# Patient Record
Sex: MALE | Race: WHITE | NOT HISPANIC OR LATINO | Employment: OTHER | URBAN - METROPOLITAN AREA
[De-identification: names, ages, dates, MRNs, and addresses within clinical notes are randomized per-mention and may not be internally consistent; named-entity substitution may affect disease eponyms.]

---

## 2017-06-26 ENCOUNTER — HOSPITAL ENCOUNTER (OUTPATIENT)
Dept: CARDIOLOGY | Facility: HOSPITAL | Age: 52
Discharge: HOME | End: 2017-06-26
Attending: INTERNAL MEDICINE | Admitting: INTERNAL MEDICINE

## 2017-09-12 ENCOUNTER — HOSPITAL ENCOUNTER (OUTPATIENT)
Dept: OTHER | Facility: HOSPITAL | Age: 52
Discharge: HOME | End: 2017-09-12
Attending: PHYSICIAN ASSISTANT | Admitting: PHYSICIAN ASSISTANT

## 2017-10-13 ENCOUNTER — OFFICE (AMBULATORY)
Dept: URBAN - METROPOLITAN AREA CLINIC 64 | Facility: CLINIC | Age: 52
End: 2017-10-13

## 2017-10-13 ENCOUNTER — ON CAMPUS - OUTPATIENT (AMBULATORY)
Dept: URBAN - METROPOLITAN AREA HOSPITAL 2 | Facility: HOSPITAL | Age: 52
End: 2017-10-13
Payer: COMMERCIAL

## 2017-10-13 VITALS
SYSTOLIC BLOOD PRESSURE: 133 MMHG | DIASTOLIC BLOOD PRESSURE: 62 MMHG | SYSTOLIC BLOOD PRESSURE: 119 MMHG | SYSTOLIC BLOOD PRESSURE: 125 MMHG | HEART RATE: 67 BPM | OXYGEN SATURATION: 96 % | RESPIRATION RATE: 16 BRPM | DIASTOLIC BLOOD PRESSURE: 73 MMHG | OXYGEN SATURATION: 98 % | OXYGEN SATURATION: 92 % | HEART RATE: 66 BPM | RESPIRATION RATE: 18 BRPM | DIASTOLIC BLOOD PRESSURE: 65 MMHG | OXYGEN SATURATION: 95 % | HEART RATE: 65 BPM | DIASTOLIC BLOOD PRESSURE: 77 MMHG | HEART RATE: 68 BPM | SYSTOLIC BLOOD PRESSURE: 143 MMHG | HEIGHT: 71 IN | HEART RATE: 73 BPM | OXYGEN SATURATION: 97 % | SYSTOLIC BLOOD PRESSURE: 94 MMHG | HEART RATE: 60 BPM | TEMPERATURE: 97.7 F | WEIGHT: 315 LBS | DIASTOLIC BLOOD PRESSURE: 68 MMHG | DIASTOLIC BLOOD PRESSURE: 48 MMHG | SYSTOLIC BLOOD PRESSURE: 135 MMHG | DIASTOLIC BLOOD PRESSURE: 67 MMHG | SYSTOLIC BLOOD PRESSURE: 149 MMHG

## 2017-10-13 DIAGNOSIS — K63.3 ULCER OF INTESTINE: ICD-10-CM

## 2017-10-13 DIAGNOSIS — Z86.010 PERSONAL HISTORY OF COLONIC POLYPS: ICD-10-CM

## 2017-10-13 DIAGNOSIS — K62.6 ULCER OF ANUS AND RECTUM: ICD-10-CM

## 2017-10-13 LAB
GI HISTOLOGY: A. UNSPECIFIED: (no result)
GI HISTOLOGY: PDF REPORT: (no result)

## 2017-10-13 PROCEDURE — 88305 TISSUE EXAM BY PATHOLOGIST: CPT | Performed by: INTERNAL MEDICINE

## 2017-10-13 PROCEDURE — 45380 COLONOSCOPY AND BIOPSY: CPT | Mod: 33 | Performed by: INTERNAL MEDICINE

## 2017-10-13 RX ADMIN — PROPOFOL: 10 INJECTION, EMULSION INTRAVENOUS at 13:13

## 2017-11-21 ENCOUNTER — HOSPITAL ENCOUNTER (OUTPATIENT)
Dept: PREOP | Facility: HOSPITAL | Age: 52
Setting detail: HOSPITAL OUTPATIENT SURGERY
Discharge: HOME | End: 2017-11-21
Attending: SURGERY | Admitting: SURGERY

## 2018-02-12 ENCOUNTER — CONVERSION ENCOUNTER (OUTPATIENT)
Dept: CARDIOLOGY | Facility: CLINIC | Age: 53
End: 2018-02-12

## 2018-02-15 ENCOUNTER — CONVERSION ENCOUNTER (OUTPATIENT)
Dept: CARDIOLOGY | Facility: CLINIC | Age: 53
End: 2018-02-15

## 2018-03-26 ENCOUNTER — HOSPITAL ENCOUNTER (OUTPATIENT)
Dept: OTHER | Facility: HOSPITAL | Age: 53
Discharge: HOME | End: 2018-03-26
Attending: SURGERY | Admitting: SURGERY

## 2018-08-20 ENCOUNTER — HOSPITAL ENCOUNTER (OUTPATIENT)
Dept: OTHER | Facility: HOSPITAL | Age: 53
Discharge: HOME | End: 2018-08-20
Attending: SURGERY | Admitting: SURGERY

## 2018-08-20 LAB
ALBUMIN SERPL-MCNC: 4.4 G/DL (ref 3.5–4.8)
ALBUMIN/GLOB SERPL: 1.6 {RATIO} (ref 1–1.7)
ALP SERPL-CCNC: 49 IU/L (ref 32–91)
ALT SERPL-CCNC: 19 IU/L (ref 17–63)
ANION GAP SERPL CALC-SCNC: 13.8 MMOL/L (ref 10–20)
AST SERPL-CCNC: 27 IU/L (ref 15–41)
BASOPHILS # BLD AUTO: 0.1 10*3/UL (ref 0–0.2)
BASOPHILS NFR BLD AUTO: 1 % (ref 0–2)
BILIRUB SERPL-MCNC: 2.3 MG/DL (ref 0.3–1.2)
BUN SERPL-MCNC: 31 MG/DL (ref 8–20)
BUN/CREAT SERPL: 25.8 (ref 6.2–20.3)
CALCIUM SERPL-MCNC: 9.8 MG/DL (ref 8.9–10.3)
CHLORIDE SERPL-SCNC: 101 MMOL/L (ref 101–111)
CONV CO2: 27 MMOL/L (ref 22–32)
CONV TOTAL PROTEIN: 7.1 G/DL (ref 6.1–7.9)
CREAT UR-MCNC: 1.2 MG/DL (ref 0.7–1.2)
DIFFERENTIAL METHOD BLD: (no result)
EOSINOPHIL # BLD AUTO: 0.2 10*3/UL (ref 0–0.3)
EOSINOPHIL # BLD AUTO: 3 % (ref 0–3)
ERYTHROCYTE [DISTWIDTH] IN BLOOD BY AUTOMATED COUNT: 13.4 % (ref 11.5–14.5)
GLOBULIN UR ELPH-MCNC: 2.7 G/DL (ref 2.5–3.8)
GLUCOSE SERPL-MCNC: 85 MG/DL (ref 65–99)
HCT VFR BLD AUTO: 42.4 % (ref 40–54)
HGB BLD-MCNC: 14.8 G/DL (ref 14–18)
LYMPHOCYTES # BLD AUTO: 2.9 10*3/UL (ref 0.8–4.8)
LYMPHOCYTES NFR BLD AUTO: 35 % (ref 18–42)
MAGNESIUM SERPL-MCNC: 2.1 MG/DL (ref 1.8–2.5)
MCH RBC QN AUTO: 32.1 PG (ref 26–32)
MCHC RBC AUTO-ENTMCNC: 34.9 G/DL (ref 32–36)
MCV RBC AUTO: 91.8 FL (ref 80–94)
MONOCYTES # BLD AUTO: 0.6 10*3/UL (ref 0.1–1.3)
MONOCYTES NFR BLD AUTO: 8 % (ref 2–11)
NEUTROPHILS # BLD AUTO: 4.5 10*3/UL (ref 2.3–8.6)
NEUTROPHILS NFR BLD AUTO: 53 % (ref 50–75)
NRBC BLD AUTO-RTO: 0 /100{WBCS}
NRBC/RBC NFR BLD MANUAL: 0 10*3/UL
PHOSPHATE SERPL-MCNC: 3.9 MG/DL (ref 2.4–4.7)
PLATELET # BLD AUTO: 219 10*3/UL (ref 150–450)
PMV BLD AUTO: 7.9 FL (ref 7.4–10.4)
POTASSIUM SERPL-SCNC: 3.8 MMOL/L (ref 3.6–5.1)
RBC # BLD AUTO: 4.62 10*6/UL (ref 4.6–6)
SODIUM SERPL-SCNC: 138 MMOL/L (ref 136–144)
WBC # BLD AUTO: 8.3 10*3/UL (ref 4.5–11.5)

## 2019-02-23 ENCOUNTER — HOSPITAL ENCOUNTER (OUTPATIENT)
Dept: LAB | Facility: HOSPITAL | Age: 54
Discharge: HOME | End: 2019-02-23
Attending: SURGERY | Admitting: SURGERY

## 2019-02-23 LAB
ALBUMIN SERPL-MCNC: 4.2 G/DL (ref 3.5–4.8)
ALBUMIN/GLOB SERPL: 2 {RATIO} (ref 1–1.7)
ALP SERPL-CCNC: 52 IU/L (ref 32–91)
ALT SERPL-CCNC: 20 IU/L (ref 17–63)
ANION GAP SERPL CALC-SCNC: 12.9 MMOL/L (ref 10–20)
AST SERPL-CCNC: 27 IU/L (ref 15–41)
BASOPHILS # BLD AUTO: 0 10*3/UL (ref 0–0.2)
BASOPHILS NFR BLD AUTO: 1 % (ref 0–2)
BILIRUB SERPL-MCNC: 1.7 MG/DL (ref 0.3–1.2)
BUN SERPL-MCNC: 26 MG/DL (ref 8–20)
BUN/CREAT SERPL: 23.6 (ref 6.2–20.3)
CALCIUM SERPL-MCNC: 9.2 MG/DL (ref 8.9–10.3)
CHLORIDE SERPL-SCNC: 103 MMOL/L (ref 101–111)
CONV CO2: 27 MMOL/L (ref 22–32)
CONV TOTAL PROTEIN: 6.3 G/DL (ref 6.1–7.9)
CREAT UR-MCNC: 1.1 MG/DL (ref 0.7–1.2)
DIFFERENTIAL METHOD BLD: (no result)
EOSINOPHIL # BLD AUTO: 0.2 10*3/UL (ref 0–0.3)
EOSINOPHIL # BLD AUTO: 4 % (ref 0–3)
ERYTHROCYTE [DISTWIDTH] IN BLOOD BY AUTOMATED COUNT: 13.7 % (ref 11.5–14.5)
GLOBULIN UR ELPH-MCNC: 2.1 G/DL (ref 2.5–3.8)
GLUCOSE SERPL-MCNC: 94 MG/DL (ref 65–99)
HCT VFR BLD AUTO: 44.8 % (ref 40–54)
HGB BLD-MCNC: 15.3 G/DL (ref 14–18)
IRON SERPL-MCNC: 104 UG/DL (ref 45–182)
LYMPHOCYTES # BLD AUTO: 1.9 10*3/UL (ref 0.8–4.8)
LYMPHOCYTES NFR BLD AUTO: 36 % (ref 18–42)
MAGNESIUM SERPL-MCNC: 2 MG/DL (ref 1.8–2.5)
MCH RBC QN AUTO: 31.8 PG (ref 26–32)
MCHC RBC AUTO-ENTMCNC: 34.1 G/DL (ref 32–36)
MCV RBC AUTO: 93.1 FL (ref 80–94)
MONOCYTES # BLD AUTO: 0.4 10*3/UL (ref 0.1–1.3)
MONOCYTES NFR BLD AUTO: 7 % (ref 2–11)
NEUTROPHILS # BLD AUTO: 2.7 10*3/UL (ref 2.3–8.6)
NEUTROPHILS NFR BLD AUTO: 52 % (ref 50–75)
NRBC BLD AUTO-RTO: 0 /100{WBCS}
NRBC/RBC NFR BLD MANUAL: 0 10*3/UL
PHOSPHATE SERPL-MCNC: 3.4 MG/DL (ref 2.4–4.7)
PLATELET # BLD AUTO: 219 10*3/UL (ref 150–450)
PMV BLD AUTO: 8.2 FL (ref 7.4–10.4)
POTASSIUM SERPL-SCNC: 3.9 MMOL/L (ref 3.6–5.1)
PREALB SERPL-MCNC: NORMAL MG/DL (ref 16–38)
RBC # BLD AUTO: 4.81 10*6/UL (ref 4.6–6)
SODIUM SERPL-SCNC: 139 MMOL/L (ref 136–144)
WBC # BLD AUTO: 5.3 10*3/UL (ref 4.5–11.5)

## 2019-06-03 VITALS — WEIGHT: 315 LBS | WEIGHT: 315 LBS | BODY MASS INDEX: 44.49 KG/M2 | BODY MASS INDEX: 44.98 KG/M2

## 2019-08-19 ENCOUNTER — LAB (OUTPATIENT)
Dept: LAB | Facility: HOSPITAL | Age: 54
End: 2019-08-19

## 2019-08-19 ENCOUNTER — OFFICE VISIT (OUTPATIENT)
Dept: BARIATRICS/WEIGHT MGMT | Facility: CLINIC | Age: 54
End: 2019-08-19

## 2019-08-19 VITALS
BODY MASS INDEX: 36.71 KG/M2 | WEIGHT: 262.2 LBS | HEART RATE: 72 BPM | SYSTOLIC BLOOD PRESSURE: 131 MMHG | DIASTOLIC BLOOD PRESSURE: 85 MMHG | HEIGHT: 71 IN | TEMPERATURE: 98.9 F

## 2019-08-19 DIAGNOSIS — E66.9 OBESITY, CLASS II, BMI 35-39.9: ICD-10-CM

## 2019-08-19 DIAGNOSIS — E66.9 OBESITY, CLASS II, BMI 35-39.9: Primary | ICD-10-CM

## 2019-08-19 LAB
ALBUMIN SERPL-MCNC: 4.9 G/DL (ref 3.5–4.8)
ALBUMIN/GLOB SERPL: 2 G/DL (ref 1–1.7)
ALP SERPL-CCNC: 41 U/L (ref 32–91)
ALT SERPL W P-5'-P-CCNC: 20 U/L (ref 17–63)
ANION GAP SERPL CALCULATED.3IONS-SCNC: 16.1 MMOL/L (ref 5–15)
AST SERPL-CCNC: 29 U/L (ref 15–41)
BASOPHILS # BLD AUTO: 0.1 10*3/MM3 (ref 0–0.2)
BASOPHILS NFR BLD AUTO: 1.2 % (ref 0–1.5)
BILIRUB SERPL-MCNC: 3 MG/DL (ref 0.3–1.2)
BUN BLD-MCNC: 28 MG/DL (ref 8–20)
BUN/CREAT SERPL: 20 (ref 6.2–20.3)
CALCIUM SPEC-SCNC: 10.8 MG/DL (ref 8.9–10.3)
CHLORIDE SERPL-SCNC: 101 MMOL/L (ref 101–111)
CO2 SERPL-SCNC: 26 MMOL/L (ref 22–32)
CREAT BLD-MCNC: 1.4 MG/DL (ref 0.7–1.2)
DEPRECATED RDW RBC AUTO: 45.5 FL (ref 37–54)
EOSINOPHIL # BLD AUTO: 0.2 10*3/MM3 (ref 0–0.4)
EOSINOPHIL NFR BLD AUTO: 3.8 % (ref 0.3–6.2)
ERYTHROCYTE [DISTWIDTH] IN BLOOD BY AUTOMATED COUNT: 14 % (ref 12.3–15.4)
FERRITIN SERPL-MCNC: 178 NG/ML (ref 24–336)
FOLATE SERPL-MCNC: >24.8 NG/ML (ref 5.9–24.8)
GFR SERPL CREATININE-BSD FRML MDRD: 53 ML/MIN/1.73
GLOBULIN UR ELPH-MCNC: 2.5 GM/DL (ref 2.5–3.8)
GLUCOSE BLD-MCNC: 101 MG/DL (ref 65–99)
HCT VFR BLD AUTO: 43.4 % (ref 37.5–51)
HGB BLD-MCNC: 14.9 G/DL (ref 13–17.7)
IRON 24H UR-MRATE: 120 MCG/DL (ref 45–182)
LYMPHOCYTES # BLD AUTO: 2.5 10*3/MM3 (ref 0.7–3.1)
LYMPHOCYTES NFR BLD AUTO: 39.7 % (ref 19.6–45.3)
MAGNESIUM SERPL-MCNC: 2.1 MG/DL (ref 1.8–2.5)
MCH RBC QN AUTO: 32 PG (ref 26.6–33)
MCHC RBC AUTO-ENTMCNC: 34.3 G/DL (ref 31.5–35.7)
MCV RBC AUTO: 93.3 FL (ref 79–97)
MONOCYTES # BLD AUTO: 0.5 10*3/MM3 (ref 0.1–0.9)
MONOCYTES NFR BLD AUTO: 7.5 % (ref 5–12)
NEUTROPHILS # BLD AUTO: 3 10*3/MM3 (ref 1.7–7)
NEUTROPHILS NFR BLD AUTO: 47.8 % (ref 42.7–76)
NRBC BLD AUTO-RTO: 0.1 /100 WBC (ref 0–0.2)
PHOSPHATE SERPL-MCNC: 4.2 MG/DL (ref 2.4–4.7)
PLATELET # BLD AUTO: 214 10*3/MM3 (ref 140–450)
PMV BLD AUTO: 7.5 FL (ref 6–12)
POTASSIUM BLD-SCNC: 4.1 MMOL/L (ref 3.6–5.1)
PREALB SERPL-MCNC: 28 MG/DL (ref 16–38)
PROT SERPL-MCNC: 7.4 G/DL (ref 6.1–7.9)
PSA SERPL-MCNC: 1.67 NG/ML (ref 0–4)
PTH-INTACT SERPL-MCNC: 14 PG/ML (ref 11–72)
RBC # BLD AUTO: 4.65 10*6/MM3 (ref 4.14–5.8)
SODIUM BLD-SCNC: 139 MMOL/L (ref 136–144)
WBC NRBC COR # BLD: 6.2 10*3/MM3 (ref 3.4–10.8)

## 2019-08-19 PROCEDURE — 83921 ORGANIC ACID SINGLE QUANT: CPT

## 2019-08-19 PROCEDURE — 84630 ASSAY OF ZINC: CPT

## 2019-08-19 PROCEDURE — 84134 ASSAY OF PREALBUMIN: CPT

## 2019-08-19 PROCEDURE — 99214 OFFICE O/P EST MOD 30 MIN: CPT | Performed by: SURGERY

## 2019-08-19 PROCEDURE — 84597 ASSAY OF VITAMIN K: CPT

## 2019-08-19 PROCEDURE — 84403 ASSAY OF TOTAL TESTOSTERONE: CPT

## 2019-08-19 PROCEDURE — G0103 PSA SCREENING: HCPCS

## 2019-08-19 PROCEDURE — 84402 ASSAY OF FREE TESTOSTERONE: CPT

## 2019-08-19 PROCEDURE — 80053 COMPREHEN METABOLIC PANEL: CPT

## 2019-08-19 PROCEDURE — 84425 ASSAY OF VITAMIN B-1: CPT

## 2019-08-19 PROCEDURE — 82746 ASSAY OF FOLIC ACID SERUM: CPT

## 2019-08-19 PROCEDURE — 84446 ASSAY OF VITAMIN E: CPT

## 2019-08-19 PROCEDURE — 36415 COLL VENOUS BLD VENIPUNCTURE: CPT

## 2019-08-19 PROCEDURE — 83540 ASSAY OF IRON: CPT

## 2019-08-19 PROCEDURE — 84100 ASSAY OF PHOSPHORUS: CPT

## 2019-08-19 PROCEDURE — 83735 ASSAY OF MAGNESIUM: CPT

## 2019-08-19 PROCEDURE — 84590 ASSAY OF VITAMIN A: CPT

## 2019-08-19 PROCEDURE — 82728 ASSAY OF FERRITIN: CPT

## 2019-08-19 PROCEDURE — 83970 ASSAY OF PARATHORMONE: CPT

## 2019-08-19 PROCEDURE — 85025 COMPLETE CBC W/AUTO DIFF WBC: CPT

## 2019-08-19 RX ORDER — NEBIVOLOL HYDROCHLORIDE 10 MG/1
TABLET ORAL
COMMUNITY
Start: 2019-07-11 | End: 2019-12-16 | Stop reason: SDUPTHER

## 2019-08-19 RX ORDER — DILTIAZEM HYDROCHLORIDE 240 MG/1
CAPSULE, EXTENDED RELEASE ORAL
COMMUNITY
Start: 2019-07-11 | End: 2019-12-16 | Stop reason: SDUPTHER

## 2019-08-19 RX ORDER — LISINOPRIL AND HYDROCHLOROTHIAZIDE 20; 12.5 MG/1; MG/1
TABLET ORAL
COMMUNITY
Start: 2019-06-05 | End: 2019-12-16 | Stop reason: SDUPTHER

## 2019-08-19 NOTE — PROGRESS NOTES
MGK BAR SURG [facility] GROUP WEIGHT MANAGEMENT  [address] [place] [address]  [address] [place] [address]  Dept: [phone]  8/19/2019      Arya Pedraza.  [id number]  [id number]  1965  male      Chief Complaint   Patient presents with   • Follow-up     18mo GS 2/20/18; consult 319#       BH Post-Op Bariatric Surgery:   Arya Pedraza is status post Laparoscopic Sleeve procedure, performed on 2/20/18     HPI:         08/19/19  1539   Weight: 119 kg (262 lb 3.2 oz)       [unfilled]      Today's weight is 119 kg (262 lb 3.2 oz) pounds, today's BMI is Body mass index is 36.57 kg/m²., he has a  loss of 3 pounds since the last visit and his weight loss since surgery is 56.8 pounds. The patient reports a decreased portion size and loss of appetite.      Arya Pedraza denies heartburn or nausea. He is lifting weights vigorously and does cardio. Although his weight has not changed much lately his clothes are looser.      Diet and Exercise: Diet history reviewed and discussed with the patient. Weight loss/gains to date discussed with the patient. The patient states they are eating 60 grams of protein per day. He reports eating 3 meals per day, a typical portion size of 1 cup, eating 1 snacks per day, drinking 6 or more 8-oz. glasses of water per day, no carbonated beverage consumption and exercising regularly.          Supplements:  .     Review of Systems   Constitutional: Negative.    HENT: Negative.    Eyes: Negative.    Respiratory: Negative.    Cardiovascular: Negative.    Gastrointestinal: Negative.    Endocrine: Negative.    Genitourinary: Negative.    Musculoskeletal: Negative.    Skin: Negative.    Allergic/Immunologic: Negative.    Neurological: Negative.    Hematological: Negative.    Psychiatric/Behavioral: Negative.        There is no problem list on file for this patient.      No past medical history on file.    The following  portions of the patient's history were reviewed and updated as appropriate: allergies, current medications, past family history, past medical history, past social history, past surgical history and problem list.    Vitals:    08/19/19 1539   BP: 131/85   Pulse: 72   Temp: 98.9 °F (37.2 °C)       Physical Exam   Constitutional: He is oriented to person, place, and time. He appears well-developed and well-nourished.   HENT:   Head: Normocephalic and atraumatic.   Eyes: Conjunctivae and EOM are normal. Pupils are equal, round, and reactive to light.   Neck: Normal range of motion. Neck supple.   Cardiovascular: Normal rate, regular rhythm, normal heart sounds and intact distal pulses.   Pulmonary/Chest: Effort normal and breath sounds normal.   Abdominal: Soft. Bowel sounds are normal. He exhibits no distension and no mass. There is no tenderness. There is no rebound and no guarding. No hernia.   Musculoskeletal: Normal range of motion. He exhibits no edema.   Neurological: He is alert and oriented to person, place, and time.   Skin: Skin is warm and dry.   Psychiatric: He has a normal mood and affect.   Vitals reviewed.         Assessment:   Post-op, the patient feels great and having good restriction. He is working out very hard. He has a history of low testosterone and so I will add that and the PSA level to his labs. .     Encounter Diagnosis   Name Primary?   • Obesity, Class II, BMI 35-39.9 Yes       Plan:     Encouraged patient to be sure to get plenty of lean protein per day through small frequent meals all with a protein source.   Activity restrictions: none.   Recommended patient be sure to get at least 70 grams of protein per day by eating small, frequent meals all with high lean protein choices. Be sure to limit/cut back on daily carbohydrate intake. Discussed with the patient the recommended amount of water per day to intake- half of body weight in ounces. Reviewed vitamin requirements. Be sure to do  routine exercise, 150 minutes per week minimum, including both cardio and strength training.     Instructions / Recommendations: dietary counseling recommended, recommended a daily protein intake of  grams, vitamin supplement(s) recommended, recommended exercising at least 150 minutes per week, behavior modifications recommended and instructed to call the office for concerns, questions, or problems.     The patient was instructed to follow up in 6 months .     The patient was counseled regarding. Total time spent face to face was 15 minutes and 15 minutes was spent counseling.

## 2019-08-21 LAB
A-TOCOPHEROL VIT E SERPL-MCNC: 14.6 MG/L (ref 7–25.1)
GAMMA TOCOPHEROL SERPL-MCNC: 1.4 MG/L (ref 0.5–5.5)
TESTOST FREE SERPL-MCNC: 7.8 PG/ML (ref 7.2–24)
TESTOST SERPL-MCNC: 426.4 NG/DL (ref 264–916)
VIT A SERPL-MCNC: 69.5 UG/DL (ref 20.1–62)
VIT B1 BLD-SCNC: 167.8 NMOL/L (ref 66.5–200)

## 2019-08-22 LAB — ZINC SERPL-MCNC: 77 UG/DL (ref 56–134)

## 2019-08-23 ENCOUNTER — OFFICE VISIT (OUTPATIENT)
Dept: CARDIOLOGY | Facility: CLINIC | Age: 54
End: 2019-08-23

## 2019-08-23 VITALS
SYSTOLIC BLOOD PRESSURE: 120 MMHG | BODY MASS INDEX: 37.35 KG/M2 | WEIGHT: 267.8 LBS | OXYGEN SATURATION: 98 % | DIASTOLIC BLOOD PRESSURE: 78 MMHG | HEART RATE: 54 BPM | RESPIRATION RATE: 18 BRPM

## 2019-08-23 DIAGNOSIS — I48.21 PERMANENT ATRIAL FIBRILLATION (HCC): Primary | ICD-10-CM

## 2019-08-23 DIAGNOSIS — I10 ESSENTIAL HYPERTENSION: ICD-10-CM

## 2019-08-23 PROBLEM — E66.01 MORBID OBESITY: Status: ACTIVE | Noted: 2017-08-15

## 2019-08-23 LAB
Lab: NORMAL
METHYLMALONATE SERPL-SCNC: 160 NMOL/L (ref 0–378)

## 2019-08-23 PROCEDURE — 99213 OFFICE O/P EST LOW 20 MIN: CPT | Performed by: INTERNAL MEDICINE

## 2019-08-23 PROCEDURE — 93000 ELECTROCARDIOGRAM COMPLETE: CPT | Performed by: INTERNAL MEDICINE

## 2019-08-23 NOTE — PROGRESS NOTES
CC--Atrial fibrillation, hypertension and obesity    54 yo gentleman  has developed atrial fibrillation since 2013 and underwent 2 cardioversions followed by an ablation treatment and remained in sinus rhythm for 3 years and in 2016 had a polyp removal from the intestine followed by re initiation of atrial fibrillation and became persistent since then-- patient has morbid obesity and underwent gastric sleeve surgery with a nearly 60 lb weight loss and patient is in functional class 1 and rate controlled atrial fibrillation and denies any shortness of breath, syncope or unusual fatigue or edema feet or any symptoms suggestive of congestive heart failure-- does not have any sleep apnea symptoms- patient is not a diabetic and denies any TIA stroke    chads Vasc score 1   patient does have hypertension and obesity and atrial fibrillation  Patient comes in for yearly follow-up with no new symptoms and remains in functional class I     assessment plan   persistent rate control atrial fibrillation without any symptoms with  chads Vasc score of  1--patient educated to take aspirin on a daily basis   no further treatment is needed since the patient has no symptoms   regular follow-up with Dr. Roe   re-evaluate this patient in 2 years unless patient develops any symptoms   hypertension   obesity          Vital Signs: Blood pressure 120/78 pulse rate is 60 irregularly irregular in atrial fibrillation respiration 12 times a minute and patient is afebrile      EKG shows underlying atrial fibrillation with a heart rate of 61 normal QRS axis and diffuse nonspecific ST-T wave changes normal QTc interval no significant EKG changes compared to prior ECG and indication for EKG includes atrial fibrillation      Current Allergies (reviewed today):  No known allergies      Past Medical History:     Reviewed history from 08/29/2017 and no changes required:        Hypertension        ventral hernia        Paroxysmal Aflutter         atrial fibrillation        Colorectal CA- negative        Hip and foot pain        Morbid obesity    Past Surgical History:     Reviewed history from 08/29/2017 and no changes required:        Knee Surgery 12/15/2011        open ventral hernia repair 11/13/12        Rt knee scoped 3-        Appendectomy - 1973 (open)        Cardioversion May 2012,5-2013        Cardiac Ablation - 7/2013        Total Right Knee Replacement - 8/2013, 8/2014        Colorectal Surgery (resection) - [facility] - 10/13/2016        Notes prior complications from anesthesia:         Denies any history of surgical complications.     Family History Summary:      Reviewed history Last on 06/05/2018 and no changes required:07/20/2018  Father - Has Family History of Other Cancer - Entered On: 3/28/2016  Mother - Has Family History of Heart Disease - Entered On: 3/28/2016    General Comments - FH:  FH Hypertension  FH Diabetes  FH Heart Disease  FH bladder cancer-father      Social History:     Reviewed history from 03/22/2018 and no changes required:        Patient has never smoked.        Passive Smoke: N        Alcohol Use: N        HIV/High Risk: N        Regular Exercise: Y        Hx Domestic Abuse: N        Evangelical Affecting Care: N                Review of Systems   General: No fatigue or tiredness, No change in weight   Eyes: No redness  Cardiovascular: No chest pain, no palpitations  Respiratory: No shortness of breath  Gastrointestinal: No nausea or vomiting, bleeding  Genitourinary: no hematuria or dysuria  Musculoskeletal: No arthralgia or myalgia  Skin: No rash  Neurologic: No numbness, tingling, syncope  Hematologic/Lymphatic: No abnormal bleeding      Physical Exam    General:      well developed, well nourished, in no acute distress.    Head:      normocephalic and atraumatic.    Eyes:      PERRL/EOM intact, conjunctiva and sclera clear with out nystagmus.    Neck:      no masses, thyromegaly,  trachea central with normal  respiratory effort and PMI nondisplaced  Lungs:      clear bilaterally to auscultation.    Heart:      irregular rhythm, normal rate, no murmurs, no rubs and no gallops.    Msk:      no deformity or scoliosis noted of thoracic or lumbar spine.    Pulses:      pulses normal in all 4 extremities.    Extremities:      no clubbing, cyanosis, edema  Neurologic:      no focal deficits,  alert and oriented x3  Skin:      intact without lesions or rashes.    Psych:      alert and cooperative; normal mood and affect; normal attention span and concentration.

## 2019-09-06 LAB — PHYTONADIONE SERPL-MCNC: NORMAL NG/L

## 2019-12-16 ENCOUNTER — TELEPHONE (OUTPATIENT)
Dept: CARDIOLOGY | Facility: CLINIC | Age: 54
End: 2019-12-16

## 2019-12-16 RX ORDER — LISINOPRIL AND HYDROCHLOROTHIAZIDE 20; 12.5 MG/1; MG/1
1 TABLET ORAL DAILY
Qty: 90 TABLET | Refills: 3 | Status: SHIPPED | OUTPATIENT
Start: 2019-12-16 | End: 2021-01-20

## 2019-12-16 RX ORDER — DILTIAZEM HYDROCHLORIDE 240 MG/1
240 CAPSULE, EXTENDED RELEASE ORAL DAILY
Qty: 90 CAPSULE | Refills: 3 | Status: SHIPPED | OUTPATIENT
Start: 2019-12-16 | End: 2020-12-10

## 2019-12-16 RX ORDER — NEBIVOLOL HYDROCHLORIDE 10 MG/1
10 TABLET ORAL DAILY
Qty: 90 TABLET | Refills: 3 | Status: SHIPPED | OUTPATIENT
Start: 2019-12-16 | End: 2020-08-18 | Stop reason: SDUPTHER

## 2019-12-16 NOTE — TELEPHONE ENCOUNTER
Patients wife called and would like all 3 prescriptions called into [facility], Bystolic 10 mg, Cartia  mg, Lisinopril-hydrochlorothiazide 20-12.5 mg

## 2020-02-24 ENCOUNTER — OFFICE VISIT (OUTPATIENT)
Dept: BARIATRICS/WEIGHT MGMT | Facility: CLINIC | Age: 55
End: 2020-02-24

## 2020-02-24 VITALS
DIASTOLIC BLOOD PRESSURE: 73 MMHG | RESPIRATION RATE: 16 BRPM | WEIGHT: 263.2 LBS | HEIGHT: 71 IN | TEMPERATURE: 98.1 F | BODY MASS INDEX: 36.85 KG/M2 | HEART RATE: 77 BPM | SYSTOLIC BLOOD PRESSURE: 114 MMHG | OXYGEN SATURATION: 97 %

## 2020-02-24 DIAGNOSIS — E29.1 HYPOGONADISM IN MALE: Primary | ICD-10-CM

## 2020-02-24 PROCEDURE — 99214 OFFICE O/P EST MOD 30 MIN: CPT | Performed by: SURGERY

## 2020-02-24 RX ORDER — TESTOSTERONE 20.25 MG/1.25G
40 GEL TOPICAL DAILY
Qty: 75 G | Refills: 0 | Status: SHIPPED | OUTPATIENT
Start: 2020-02-24 | End: 2020-03-25

## 2020-02-24 NOTE — PROGRESS NOTES
MGK BAR SURG [facility]ICAL GROUP WEIGHT MANAGEMENT  [address] [place] [address]  [address] [place] [address]  Dept: [phone]  2/24/2020      Arya Pedraza.  [id number]  [id number]  1965  male      Chief Complaint   Patient presents with   • Follow-up     2 yr GS        Post-Op Bariatric Surgery:   Arya Pedraza is status post procedure listed above  HPI:     Wt Readings from Last 10 Encounters:   02/24/20 119 kg (263 lb 3.2 oz)   08/23/19 121 kg (267 lb 12.8 oz)   08/19/19 119 kg (262 lb 3.2 oz)   03/25/19 120 kg (264 lb)   02/11/19 120 kg (265 lb 2 oz)   11/19/18 125 kg (274 lb 8 oz)   09/24/18 122 kg (270 lb)   08/20/18 121 kg (267 lb 8 oz)   07/20/18 125 kg (275 lb 3.2 oz)   06/05/18 127 kg (279 lb 6.4 oz)        Today's weight is 119 kg (263 lb 3.2 oz) pounds, today's BMI is Body mass index is 36.71 kg/m²., has a  loss of 3.8 pounds since the last visit and a weight loss since surgery is 60 pounds. Total weight loss since pt heaviest 100 pound. The patient reports a decreased portion size and loss of appetite.      Arya Pedraza denies gerd, nausea or vomiting      Diet and Exercise: Diet history reviewed and discussed with the patient. Weight loss/gains to date discussed with the patient. The patient states they are eating 40-50  grams of protein per day. He reports eating 3 meals per day, a typical portion size of 1/2-1 cup, eating 1 snacks per day, drinking 8 or more 8-oz. glasses of water per day, no carbonated beverage consumption and exercising regularly.     Breakfast: eggs   Lunch cottage cheese  Dinner: chicken or baked fish  Snacks: fruit, almonds       Supplements: protein shakes - chocolate whey protein.   Vitamins: bariatric advantage   Exercise: cardio 5 times per week, treadmill      Weight loss goal 225     Review of Systems   All other systems reviewed and are negative.      Patient Active Problem List   Diagnosis    • Atrial fibrillation (CMS/HCC)   • Benign essential hypertension   • Morbid obesity (CMS/HCC)       Past Medical History:   Diagnosis Date   • Atrial fibrillation (CMS/HCC)    • Colorectal cancer (CMS/HCC)    • Foot pain    • Hip pain    • Hypertension    • Obesity    • Paroxysmal atrial flutter (CMS/HCC)    • Ventral hernia        The following portions of the patient's history were reviewed and updated as appropriate: allergies, current medications, past family history, past medical history, past social history, past surgical history and problem list.    Vitals:    02/24/20 1531   BP: 114/73   Pulse: 77   Resp: 16   Temp: 98.1 °F (36.7 °C)   SpO2: 97%       Physical Exam   Constitutional: He is oriented to person, place, and time. He appears well-developed and well-nourished.   HENT:   Head: Normocephalic and atraumatic.   Eyes: Pupils are equal, round, and reactive to light. Conjunctivae and EOM are normal.   Neck: Normal range of motion. Neck supple.   Cardiovascular: Normal rate, regular rhythm, normal heart sounds and intact distal pulses.   Pulmonary/Chest: Effort normal and breath sounds normal.   Abdominal: Soft. Bowel sounds are normal. He exhibits no distension and no mass. There is no tenderness. There is no rebound and no guarding. No hernia.   Musculoskeletal: Normal range of motion. He exhibits no edema.   Neurological: He is alert and oriented to person, place, and time.   Skin: Skin is warm and dry.   Psychiatric: He has a normal mood and affect.   Vitals reviewed.         Assessment:   Post-op, the patient doing well.  Pt interested in starting testosterone injection Twice weekly for fatigue. PSA level checked and normal.     Plan:     Will prescribe testosterone topical gel daily. The patient was instructed to follow up as soon as testosterone in so that patient can come into the office for education regarding administration. Recheck testosterone level 1 month.      Encouraged patient to be  sure to get plenty of lean protein per day through small frequent meals all with a protein source. Encourage 100+ protein grams per day.   Activity restrictions: none.   Recommended patient be sure to get at least 70 grams of protein per day by eating small, frequent meals all with high lean protein choices. Be sure to limit/cut back on daily carbohydrate intake. Discussed with the patient the recommended amount of water per day to intake- half of body weight in ounces. Reviewed vitamin requirements. Be sure to do routine exercise, 150 minutes per week minimum, including both cardio and strength training.     Instructions / Recommendations: dietary counseling recommended, recommended a daily protein intake of  grams, vitamin supplement(s) recommended, recommended exercising at least 150 minutes per week, behavior modifications recommended and instructed to call the office for concerns, questions, or problems.      Follow up when testosterone in and ready to start administration.   The patient was counseled regarding. Total time spent face to face was 15 minutes and 15 minutes was spent counseling.

## 2020-03-02 DIAGNOSIS — E29.1 HYPOGONADISM IN MALE: ICD-10-CM

## 2020-03-18 ENCOUNTER — TELEPHONE (OUTPATIENT)
Dept: BARIATRICS/WEIGHT MGMT | Facility: CLINIC | Age: 55
End: 2020-03-18

## 2020-03-18 DIAGNOSIS — E66.9 OBESITY, CLASS II, BMI 35-39.9: ICD-10-CM

## 2020-03-18 DIAGNOSIS — E29.1 HYPOGONADISM IN MALE: Primary | ICD-10-CM

## 2020-03-18 NOTE — TELEPHONE ENCOUNTER
[facility] for patient to call back for details pertaining to him needing a second testosterone level to be approved for his gel.  Orders have been put into Epic for him to have completed.

## 2020-03-20 NOTE — TELEPHONE ENCOUNTER
Spoke with wife Keisha.  Will contact [facility] to see protocol for walk in labs or if we need to readdress after high alert.

## 2020-03-23 PROBLEM — Z98.84 STATUS POST BARIATRIC SURGERY: Status: ACTIVE | Noted: 2018-03-08

## 2020-04-06 ENCOUNTER — OFFICE VISIT (OUTPATIENT)
Dept: CARDIOLOGY | Facility: CLINIC | Age: 55
End: 2020-04-06

## 2020-04-06 VITALS
SYSTOLIC BLOOD PRESSURE: 122 MMHG | HEART RATE: 72 BPM | WEIGHT: 271 LBS | HEIGHT: 71 IN | DIASTOLIC BLOOD PRESSURE: 76 MMHG | BODY MASS INDEX: 37.94 KG/M2

## 2020-04-06 DIAGNOSIS — I10 BENIGN ESSENTIAL HYPERTENSION: ICD-10-CM

## 2020-04-06 DIAGNOSIS — E66.01 MORBID OBESITY (HCC): ICD-10-CM

## 2020-04-06 DIAGNOSIS — I48.21 PERMANENT ATRIAL FIBRILLATION (HCC): Primary | ICD-10-CM

## 2020-04-06 PROCEDURE — 93000 ELECTROCARDIOGRAM COMPLETE: CPT | Performed by: INTERNAL MEDICINE

## 2020-04-06 PROCEDURE — 99213 OFFICE O/P EST LOW 20 MIN: CPT | Performed by: INTERNAL MEDICINE

## 2020-04-06 RX ORDER — ASPIRIN 81 MG/1
81 TABLET ORAL DAILY
COMMUNITY

## 2020-08-18 RX ORDER — NEBIVOLOL HYDROCHLORIDE 10 MG/1
10 TABLET ORAL DAILY
Qty: 90 TABLET | Refills: 3 | Status: SHIPPED | OUTPATIENT
Start: 2020-08-18 | End: 2021-07-15

## 2020-10-16 ENCOUNTER — ON CAMPUS - OUTPATIENT (AMBULATORY)
Dept: URBAN - METROPOLITAN AREA HOSPITAL 2 | Facility: HOSPITAL | Age: 55
End: 2020-10-16

## 2020-10-16 VITALS
DIASTOLIC BLOOD PRESSURE: 80 MMHG | DIASTOLIC BLOOD PRESSURE: 89 MMHG | OXYGEN SATURATION: 100 % | WEIGHT: 264 LBS | HEART RATE: 83 BPM | OXYGEN SATURATION: 98 % | HEART RATE: 68 BPM | DIASTOLIC BLOOD PRESSURE: 77 MMHG | HEART RATE: 67 BPM | SYSTOLIC BLOOD PRESSURE: 119 MMHG | SYSTOLIC BLOOD PRESSURE: 146 MMHG | SYSTOLIC BLOOD PRESSURE: 84 MMHG | SYSTOLIC BLOOD PRESSURE: 132 MMHG | HEART RATE: 76 BPM | OXYGEN SATURATION: 97 % | HEIGHT: 71 IN | HEART RATE: 74 BPM | HEART RATE: 79 BPM | DIASTOLIC BLOOD PRESSURE: 75 MMHG | RESPIRATION RATE: 16 BRPM | HEART RATE: 80 BPM | SYSTOLIC BLOOD PRESSURE: 91 MMHG | TEMPERATURE: 98.2 F | SYSTOLIC BLOOD PRESSURE: 122 MMHG | SYSTOLIC BLOOD PRESSURE: 137 MMHG | RESPIRATION RATE: 18 BRPM | DIASTOLIC BLOOD PRESSURE: 76 MMHG | DIASTOLIC BLOOD PRESSURE: 87 MMHG | HEART RATE: 75 BPM | SYSTOLIC BLOOD PRESSURE: 114 MMHG | DIASTOLIC BLOOD PRESSURE: 60 MMHG | DIASTOLIC BLOOD PRESSURE: 59 MMHG | OXYGEN SATURATION: 99 %

## 2020-10-16 DIAGNOSIS — Z86.010 PERSONAL HISTORY OF COLONIC POLYPS: ICD-10-CM

## 2020-10-16 DIAGNOSIS — Z98.890 OTHER SPECIFIED POSTPROCEDURAL STATES: ICD-10-CM

## 2020-10-16 PROCEDURE — 45378 DIAGNOSTIC COLONOSCOPY: CPT | Mod: 33 | Performed by: INTERNAL MEDICINE

## 2020-12-10 RX ORDER — DILTIAZEM HYDROCHLORIDE 240 MG/1
CAPSULE, EXTENDED RELEASE ORAL
Qty: 90 CAPSULE | Refills: 3 | Status: SHIPPED | OUTPATIENT
Start: 2020-12-10 | End: 2021-11-12 | Stop reason: SDUPTHER

## 2021-01-20 RX ORDER — LISINOPRIL AND HYDROCHLOROTHIAZIDE 20; 12.5 MG/1; MG/1
TABLET ORAL
Qty: 90 TABLET | Refills: 1 | Status: SHIPPED | OUTPATIENT
Start: 2021-01-20 | End: 2021-07-14

## 2021-03-25 ENCOUNTER — TELEPHONE (OUTPATIENT)
Dept: BARIATRICS/WEIGHT MGMT | Facility: CLINIC | Age: 56
End: 2021-03-25

## 2021-04-06 ENCOUNTER — OFFICE VISIT (OUTPATIENT)
Dept: CARDIOLOGY | Facility: CLINIC | Age: 56
End: 2021-04-06

## 2021-04-06 VITALS
OXYGEN SATURATION: 98 % | HEIGHT: 71 IN | DIASTOLIC BLOOD PRESSURE: 86 MMHG | WEIGHT: 261 LBS | SYSTOLIC BLOOD PRESSURE: 112 MMHG | HEART RATE: 62 BPM | BODY MASS INDEX: 36.54 KG/M2

## 2021-04-06 DIAGNOSIS — I48.21 PERMANENT ATRIAL FIBRILLATION (HCC): Primary | ICD-10-CM

## 2021-04-06 DIAGNOSIS — I10 BENIGN ESSENTIAL HYPERTENSION: ICD-10-CM

## 2021-04-06 PROCEDURE — 93000 ELECTROCARDIOGRAM COMPLETE: CPT | Performed by: INTERNAL MEDICINE

## 2021-04-06 PROCEDURE — 99214 OFFICE O/P EST MOD 30 MIN: CPT | Performed by: INTERNAL MEDICINE

## 2021-04-06 NOTE — PROGRESS NOTES
Cardiology Office Visit Note      Referring physician:      Reason For Followup: 1 year follow up    HPI:  Arya Pedraza is a 55 y.o. male presents today for an annual follow up visit. Patient has known history of  permanent afib and HTN.  Significant hx for  Gastric sleeve surgery 5/2018. Previous consult with Dr Lara and decision made for possible ablative therapy, though it was decided that he was doing well with rate control and would continue on same medications.      The patient returns today for follow up with no specific cardiac complaints.  He denies chest pain, dyspnea, PND, orthopnea, palpitations, near syncope, lower extremity edema or feelings of his heart racing.    Brendan continues to do very well post gastric bypass with greater than 100 pound weight reduction and continuing with disciplined lifestyle in both diet and exercising every single day since December 1.  He is also quite pleased with recent life decision to retire from his previous job of 33 years.      He reports he has been compliant with prescribed medications.      Past Medical History:   Diagnosis Date   • Atrial fibrillation (CMS/HCC)    • Colorectal cancer (CMS/HCC)    • Foot pain    • Hip pain    • Hypertension    • Obesity    • Paroxysmal atrial flutter (CMS/HCC)    • Ventral hernia        Past Surgical History:   Procedure Laterality Date   • ABLATION OF DYSRHYTHMIC FOCUS  07/2013   • APPENDECTOMY     • CARDIOVERSION      2012; 2013   • COLON RESECTION  10/13/2016    [facility]   • KNEE ARTHROSCOPY Right 03/29/2013   • KNEE SURGERY  12/15/2011   • KNEE SURGERY Right     total knee replacement   • VENTRAL/INCISIONAL HERNIA REPAIR  11/13/2012    open procedure         Current Outpatient Medications:   •  aspirin (ASPIR) 81 MG EC tablet, Take 81 mg by mouth Daily., Disp: , Rfl:   •  BYSTOLIC 10 MG tablet, Take 1 tablet by mouth Daily., Disp: 90 tablet, Rfl: 3  •  Cartia  MG 24 hr capsule, TAKE 1 CAPSULE DAILY, Disp: 90  "capsule, Rfl: 3  •  lisinopril-hydrochlorothiazide (PRINZIDE,ZESTORETIC) 20-12.5 MG per tablet, TAKE 1 TABLET DAILY, Disp: 90 tablet, Rfl: 1  •  Multiple Minerals-Vitamins (CALCIUM-MAGNESIUM-ZINC-D3 PO), CALCIUM + D3 & MAGNESIUM, Disp: , Rfl:   •  NON FORMULARY, Bariatric Vitamin, Disp: , Rfl:   •  Pediatric Multivitamins-Iron (KIDS VITAMINS PLUS IRON) 15 MG chewable tablet, IRON PLUS VITAMIN C, Disp: , Rfl:     Social History     Socioeconomic History   • Marital status:      Spouse name: Not on file   • Number of children: Not on file   • Years of education: Not on file   • Highest education level: Not on file   Tobacco Use   • Smoking status: Never Smoker   • Smokeless tobacco: Never Used   Vaping Use   • Vaping Use: Never used   Substance and Sexual Activity   • Alcohol use: Yes   • Drug use: No   • Sexual activity: Defer       Family History   Problem Relation Age of Onset   • Heart disease Mother    • Cancer Father          Review of Systems   General: denies fever, chills, anorexia, weight loss  Eyes: denies blurring, diplopia  Ear/Nose/Throat: denies ear pain, nosebleeds, hoarseness  Cardiovascular: See HPI  Respiratory: denies excessive sputum, hemoptysis, wheezing  Gastrointestinal: denies nausea, vomiting, change in bowel habits, abdominal pain  Genitourinary: No hematuria dysuria or nocturia  Musculoskeletal: Minor weightbearing joint arthralgias despite being status post bilateral TKR in 2013 and 2014 respectively  Skin: denies rashes, itching, suspicious lesions  Neurologic: denies focal neuro deficits  Psychiatric: denies depression, anxiety  Endocrine: denies cold intolerance, heat intolerance  Hematologic/Lymphatic: denies abnormal bruising, bleeding  Allergic/Immunologic: denies urticaria or persistent infections      Objective     Visit Vitals  /86   Pulse 62   Ht 180.3 cm (70.98\")   Wt 118 kg (261 lb)   SpO2 98%   BMI 36.42 kg/m²           Physical Exam  General:     Obese, well " developed,, in no acute distress.    Head:     normocephalic and atraumatic.    Eyes:    PERRL/EOM intact, conjunctiva and sclera clear with out nystagmus.    Neck:    no jvd or bruits  Chest Wall:    no deformities   Lungs:    clear bilaterally to auscultation with adequate global airflow   Heart:    non-displaced PMI; irregularly irregular rhythm consistent with atrial fibrillation with controlled ventricular rates, normal S1, S2 without murmurs, rubs, or gallops  Abdomen:  Soft, nontender without HSM  Msk:    no deformity; adequate R OM  Pulses:    pulses normal in all 4 extremities.    Extremities:    no clubbing, cyanosis, edema; bilateral TKR scars noted  Neurologic:    no focal sensory or motor deficits  Skin:    intact without lesions or rashes.    Psych:    alert and cooperative; normal mood and affect; normal attention span and concentration.            ECG 12 Lead    Date/Time: 4/6/2021 2:00 PM  Performed by: ZORAIDA Roe MD  Authorized by: ZORAIDA Roe MD   Comparison: compared with previous ECG from 4/6/2020  Similar to previous ECG  Rhythm: atrial fibrillation  Rate: normal  QRS axis: normal    Clinical impression: abnormal EKG              Assessment:   Problems Addressed this Visit        Other    Permanent atrial fibrillation - Primary  --- Adequate rate control and fully anticoagulated  --Failed previous attempt at ablation  -Remains hemodynamically well compensated with very good functional activity tolerance      Benign essential hypertension  --Well-regulated on current medication listed reviewed in detail  -May decrease antihypertensive regimen going forward as patient continues with weight reduction lifestyle changes      Diagnoses       Codes Comments    Permanent atrial fibrillation    -  Primary ICD-10-CM: I48.21  ICD-9-CM: 427.31     Benign essential hypertension     ICD-10-CM: I10  ICD-9-CM: 401.1             Plan:  Continue current medications as listed and reviewed in detail  today.  Discussed option of decreasing Bystolic by one half if heart rates sustained below 60; consider decreasing lisinopril/HCTZ if systolic blood pressure remains below 110 mmHg on a regular basis.  Otherwise, I encouraged his continued diligent efforts toward weight reduction rather progressive exercise.  Return to clinic 1 year or sooner if needed    ZORAIDA Roe MD  4/6/2021 14:40 EDT    This report was generated using the Dragon voice recognition system.

## 2021-07-14 RX ORDER — LISINOPRIL AND HYDROCHLOROTHIAZIDE 20; 12.5 MG/1; MG/1
TABLET ORAL
Qty: 90 TABLET | Refills: 3 | Status: SHIPPED | OUTPATIENT
Start: 2021-07-14 | End: 2022-06-22 | Stop reason: SDUPTHER

## 2021-07-15 RX ORDER — NEBIVOLOL HYDROCHLORIDE 10 MG/1
TABLET ORAL
Qty: 90 TABLET | Refills: 3 | Status: SHIPPED | OUTPATIENT
Start: 2021-07-15 | End: 2022-01-04 | Stop reason: SDUPTHER

## 2021-08-13 ENCOUNTER — TELEPHONE (OUTPATIENT)
Dept: BARIATRICS/WEIGHT MGMT | Facility: CLINIC | Age: 56
End: 2021-08-13

## 2021-08-27 ENCOUNTER — OFFICE VISIT (OUTPATIENT)
Dept: BARIATRICS/WEIGHT MGMT | Facility: CLINIC | Age: 56
End: 2021-08-27

## 2021-08-27 VITALS
TEMPERATURE: 99.1 F | SYSTOLIC BLOOD PRESSURE: 111 MMHG | OXYGEN SATURATION: 97 % | BODY MASS INDEX: 34.41 KG/M2 | HEART RATE: 159 BPM | DIASTOLIC BLOOD PRESSURE: 77 MMHG | RESPIRATION RATE: 18 BRPM | HEIGHT: 71 IN | WEIGHT: 245.8 LBS

## 2021-08-27 DIAGNOSIS — E66.9 OBESITY, CLASS II, BMI 35-39.9: Primary | ICD-10-CM

## 2021-08-27 PROCEDURE — 99213 OFFICE O/P EST LOW 20 MIN: CPT | Performed by: SURGERY

## 2021-08-27 NOTE — PROGRESS NOTES
[facility] BAR SURG [facility] BARIATRIC SURGERY  [address] [place] [address]  [address] [place] [address]  Dept: [phone]  8/27/2021      Arya Pedraza.  [id number]  [id number]  1965  male      Chief Complaint   Patient presents with   • Follow-up     3 yr GS - Continuous active afib       BH Post-Op Bariatric Surgery:   Arya Pedraza is status post procedure listed above  HPI:     Wt Readings from Last 10 Encounters:   08/27/21 111 kg (245 lb 12.8 oz)   04/06/21 118 kg (261 lb)   04/06/20 123 kg (271 lb)   02/24/20 119 kg (263 lb 3.2 oz)   08/23/19 121 kg (267 lb 12.8 oz)   08/19/19 119 kg (262 lb 3.2 oz)   03/25/19 120 kg (264 lb)   02/11/19 120 kg (265 lb 2 oz)   11/19/18 125 kg (274 lb 8 oz)   09/24/18 122 kg (270 lb)        Today's weight is 111 kg (245 lb 12.8 oz) pounds, today's BMI is Body mass index is 34.28 kg/m².,@ has a  loss of 14 pounds since the last visit and@ weight loss since surgery is 60 pounds. The patient reports a decreased portion size and loss of appetite.      Arya Pedraza denies gerd     Diet and Exercise: He is very active.  He does both weight training and cardio training almost every day and also golfs about 4 times per week.  Diet history reviewed and discussed with the patient. Weight loss/gains to date discussed with the patient. The patient states they are eating 60 grams of protein per day. He reports eating 3 meals per day, a typical portion size of 1 cup, eating 1 snacks per day, drinking 4 or more 8-oz. glasses of water per day, no carbonated beverage consumption and exercising regularly.         Supplements: none.     Review of Systems   Constitutional: Negative.    HENT: Negative.    Eyes: Negative.    Respiratory: Negative.    Cardiovascular: Negative.    Gastrointestinal: Negative.    Endocrine: Negative.    Genitourinary: Negative.    Musculoskeletal: Negative.    Skin: Negative.     Allergic/Immunologic: Negative.    Neurological: Negative.    Hematological: Negative.    Psychiatric/Behavioral: Negative.        Patient Active Problem List   Diagnosis   • Permanent atrial fibrillation   • Benign essential hypertension   • Morbid obesity (CMS/HCC)   • Body mass index 45.0-49.9, adult (CMS/HCC)   • Status post bariatric surgery       Past Medical History:   Diagnosis Date   • Atrial fibrillation (CMS/HCC)    • Colorectal cancer (CMS/HCC)    • Foot pain    • Hip pain    • Hypertension    • Obesity    • Paroxysmal atrial flutter (CMS/HCC)    • Ventral hernia        The following portions of the patient's history were reviewed and updated as appropriate: allergies, current medications, past family history, past medical history, past social history, past surgical history and problem list.    Vitals:    08/27/21 0914   BP: 111/77   Pulse: (!) 159   Resp: 18   Temp: 99.1 °F (37.3 °C)   SpO2: 97%       Physical Exam  Vitals reviewed.   Constitutional:       Appearance: He is well-developed.   HENT:      Head: Normocephalic and atraumatic.   Eyes:      Conjunctiva/sclera: Conjunctivae normal.      Pupils: Pupils are equal, round, and reactive to light.   Cardiovascular:      Rate and Rhythm: Normal rate and regular rhythm.      Heart sounds: Normal heart sounds.   Pulmonary:      Effort: Pulmonary effort is normal.      Breath sounds: Normal breath sounds.   Abdominal:      General: Bowel sounds are normal. There is no distension.      Palpations: Abdomen is soft. There is no mass.      Tenderness: There is no abdominal tenderness. There is no guarding or rebound.      Hernia: No hernia is present.   Musculoskeletal:         General: Normal range of motion.      Cervical back: Normal range of motion and neck supple.   Skin:     General: Skin is warm and dry.   Neurological:      Mental Status: He is alert and oriented to person, place, and time.            Assessment:   Post-op, the patient is doing  excellent.  He is doing an exemplary job of staying active and eats small portion sizes and an overall healthy diet..     Plan:     Encouraged patient to be sure to get plenty of lean protein per day through small frequent meals all with a protein source.   Activity restrictions: none.   Recommended patient be sure to get at least 70 grams of protein per day by eating small, frequent meals all with high lean protein choices. Be sure to limit/cut back on daily carbohydrate intake. Discussed with the patient the recommended amount of water per day to intake- half of body weight in ounces. Reviewed vitamin requirements. Be sure to do routine exercise, 150 minutes per week minimum, including both cardio and strength training.     Instructions / Recommendations: dietary counseling recommended, recommended a daily protein intake of  grams, vitamin supplement(s) recommended, recommended exercising at least 150 minutes per week, behavior modifications recommended and instructed to call the office for concerns, questions, or problems.     The patient was instructed to follow up in 1 YEAR .     The patient was counseled regarding. Total time spent face to face was 15 minutes and 15 minutes was spent counseling.

## 2021-09-08 ENCOUNTER — LAB (OUTPATIENT)
Dept: LAB | Facility: HOSPITAL | Age: 56
End: 2021-09-08

## 2021-09-08 DIAGNOSIS — E66.9 CLASS 2 OBESITY IN ADULT, UNSPECIFIED BMI, UNSPECIFIED OBESITY TYPE, UNSPECIFIED WHETHER SERIOUS COMORBIDITY PRESENT: Primary | ICD-10-CM

## 2021-09-08 DIAGNOSIS — E66.9 OBESITY, CLASS II, BMI 35-39.9: ICD-10-CM

## 2021-09-08 LAB
ALBUMIN SERPL-MCNC: 4.7 G/DL (ref 3.5–5.2)
ALBUMIN/GLOB SERPL: 2.1 G/DL
ALP SERPL-CCNC: 43 U/L (ref 39–117)
ALT SERPL W P-5'-P-CCNC: 25 U/L (ref 1–41)
ANION GAP SERPL CALCULATED.3IONS-SCNC: 7.8 MMOL/L (ref 5–15)
AST SERPL-CCNC: 30 U/L (ref 1–40)
BASOPHILS # BLD AUTO: 0.03 10*3/MM3 (ref 0–0.2)
BASOPHILS NFR BLD AUTO: 0.6 % (ref 0–1.5)
BILIRUB SERPL-MCNC: 1.5 MG/DL (ref 0–1.2)
BUN SERPL-MCNC: 16 MG/DL (ref 6–20)
BUN/CREAT SERPL: 14.3 (ref 7–25)
CALCIUM SPEC-SCNC: 9.9 MG/DL (ref 8.6–10.5)
CHLORIDE SERPL-SCNC: 108 MMOL/L (ref 98–107)
CO2 SERPL-SCNC: 25.2 MMOL/L (ref 22–29)
CREAT SERPL-MCNC: 1.12 MG/DL (ref 0.76–1.27)
DEPRECATED RDW RBC AUTO: 48.6 FL (ref 37–54)
EOSINOPHIL # BLD AUTO: 0.15 10*3/MM3 (ref 0–0.4)
EOSINOPHIL NFR BLD AUTO: 3 % (ref 0.3–6.2)
ERYTHROCYTE [DISTWIDTH] IN BLOOD BY AUTOMATED COUNT: 14 % (ref 12.3–15.4)
FERRITIN SERPL-MCNC: 142 NG/ML (ref 30–400)
FOLATE SERPL-MCNC: 10.8 NG/ML (ref 4.78–24.2)
GFR SERPL CREATININE-BSD FRML MDRD: 68 ML/MIN/1.73
GLOBULIN UR ELPH-MCNC: 2.2 GM/DL
GLUCOSE SERPL-MCNC: 87 MG/DL (ref 65–99)
HCT VFR BLD AUTO: 40.1 % (ref 37.5–51)
HGB BLD-MCNC: 13.2 G/DL (ref 13–17.7)
IMM GRANULOCYTES # BLD AUTO: 0.01 10*3/MM3 (ref 0–0.05)
IMM GRANULOCYTES NFR BLD AUTO: 0.2 % (ref 0–0.5)
IRON 24H UR-MRATE: 109 MCG/DL (ref 59–158)
LYMPHOCYTES # BLD AUTO: 1.46 10*3/MM3 (ref 0.7–3.1)
LYMPHOCYTES NFR BLD AUTO: 29.2 % (ref 19.6–45.3)
MAGNESIUM SERPL-MCNC: 2.2 MG/DL (ref 1.6–2.6)
MCH RBC QN AUTO: 31.1 PG (ref 26.6–33)
MCHC RBC AUTO-ENTMCNC: 32.9 G/DL (ref 31.5–35.7)
MCV RBC AUTO: 94.6 FL (ref 79–97)
MONOCYTES # BLD AUTO: 0.29 10*3/MM3 (ref 0.1–0.9)
MONOCYTES NFR BLD AUTO: 5.8 % (ref 5–12)
NEUTROPHILS NFR BLD AUTO: 3.06 10*3/MM3 (ref 1.7–7)
NEUTROPHILS NFR BLD AUTO: 61.2 % (ref 42.7–76)
NRBC BLD AUTO-RTO: 0 /100 WBC (ref 0–0.2)
PHOSPHATE SERPL-MCNC: 2.6 MG/DL (ref 2.5–4.5)
PLATELET # BLD AUTO: 199 10*3/MM3 (ref 140–450)
PMV BLD AUTO: 9.8 FL (ref 6–12)
POTASSIUM SERPL-SCNC: 4.1 MMOL/L (ref 3.5–5.2)
PREALB SERPL-MCNC: 23.7 MG/DL (ref 20–40)
PROT SERPL-MCNC: 6.9 G/DL (ref 6–8.5)
PTH-INTACT SERPL-MCNC: 30.8 PG/ML (ref 15–65)
RBC # BLD AUTO: 4.24 10*6/MM3 (ref 4.14–5.8)
SODIUM SERPL-SCNC: 141 MMOL/L (ref 136–145)
WBC # BLD AUTO: 5 10*3/MM3 (ref 3.4–10.8)

## 2021-09-08 PROCEDURE — 84134 ASSAY OF PREALBUMIN: CPT

## 2021-09-08 PROCEDURE — 36415 COLL VENOUS BLD VENIPUNCTURE: CPT

## 2021-09-08 PROCEDURE — 84402 ASSAY OF FREE TESTOSTERONE: CPT

## 2021-09-08 PROCEDURE — 84630 ASSAY OF ZINC: CPT

## 2021-09-08 PROCEDURE — 84597 ASSAY OF VITAMIN K: CPT

## 2021-09-08 PROCEDURE — 84590 ASSAY OF VITAMIN A: CPT

## 2021-09-08 PROCEDURE — 84100 ASSAY OF PHOSPHORUS: CPT

## 2021-09-08 PROCEDURE — 85025 COMPLETE CBC W/AUTO DIFF WBC: CPT

## 2021-09-08 PROCEDURE — 83921 ORGANIC ACID SINGLE QUANT: CPT

## 2021-09-08 PROCEDURE — 82746 ASSAY OF FOLIC ACID SERUM: CPT

## 2021-09-08 PROCEDURE — 83540 ASSAY OF IRON: CPT

## 2021-09-08 PROCEDURE — 80053 COMPREHEN METABOLIC PANEL: CPT

## 2021-09-08 PROCEDURE — 83970 ASSAY OF PARATHORMONE: CPT

## 2021-09-08 PROCEDURE — 82728 ASSAY OF FERRITIN: CPT

## 2021-09-08 PROCEDURE — 84425 ASSAY OF VITAMIN B-1: CPT

## 2021-09-08 PROCEDURE — 84446 ASSAY OF VITAMIN E: CPT

## 2021-09-08 PROCEDURE — 83735 ASSAY OF MAGNESIUM: CPT

## 2021-09-08 PROCEDURE — 84403 ASSAY OF TOTAL TESTOSTERONE: CPT

## 2021-09-10 LAB
TESTOST FREE SERPL-MCNC: 10.6 PG/ML (ref 7.2–24)
TESTOST SERPL-MCNC: 536.3 NG/DL (ref 264–916)

## 2021-09-11 LAB — VIT B1 BLD-SCNC: 116.6 NMOL/L (ref 66.5–200)

## 2021-09-13 LAB
A-TOCOPHEROL VIT E SERPL-MCNC: 13.1 MG/L (ref 7–25.1)
GAMMA TOCOPHEROL SERPL-MCNC: 1.5 MG/L (ref 0.5–5.5)
PHYTONADIONE SERPL-MCNC: 0.29 NG/ML (ref 0.1–2.2)
VIT A SERPL-MCNC: 50.5 UG/DL (ref 20.1–62)

## 2021-09-15 LAB — ZINC SERPL-MCNC: 89 UG/DL (ref 44–115)

## 2021-09-16 LAB
Lab: NORMAL
METHYLMALONATE SERPL-SCNC: 135 NMOL/L (ref 0–378)

## 2021-11-12 RX ORDER — DILTIAZEM HYDROCHLORIDE 240 MG/1
240 CAPSULE, EXTENDED RELEASE ORAL DAILY
Qty: 90 CAPSULE | Refills: 1 | Status: SHIPPED | OUTPATIENT
Start: 2021-11-12 | End: 2022-05-03

## 2022-01-04 ENCOUNTER — TELEPHONE (OUTPATIENT)
Dept: CARDIOLOGY | Facility: CLINIC | Age: 57
End: 2022-01-04

## 2022-01-04 RX ORDER — NEBIVOLOL HYDROCHLORIDE 10 MG/1
10 TABLET ORAL DAILY
Qty: 90 TABLET | Refills: 0 | Status: SHIPPED | OUTPATIENT
Start: 2022-01-04 | End: 2022-01-07 | Stop reason: SDUPTHER

## 2022-01-07 ENCOUNTER — TELEPHONE (OUTPATIENT)
Dept: CARDIOLOGY | Facility: CLINIC | Age: 57
End: 2022-01-07

## 2022-01-07 RX ORDER — NEBIVOLOL 10 MG/1
10 TABLET ORAL DAILY
Qty: 90 TABLET | Refills: 0 | Status: SHIPPED | OUTPATIENT
Start: 2022-01-07 | End: 2022-04-04

## 2022-01-07 NOTE — TELEPHONE ENCOUNTER
Bystolic 10 mg was sent in name brand  It was suppose to be sent in generic can [facility]

## 2022-04-04 RX ORDER — NEBIVOLOL 10 MG/1
TABLET ORAL
Qty: 90 TABLET | Refills: 0 | Status: SHIPPED | OUTPATIENT
Start: 2022-04-04 | End: 2022-06-22

## 2022-04-19 ENCOUNTER — OFFICE VISIT (OUTPATIENT)
Dept: CARDIOLOGY | Facility: CLINIC | Age: 57
End: 2022-04-19

## 2022-04-19 VITALS
HEIGHT: 71 IN | DIASTOLIC BLOOD PRESSURE: 86 MMHG | BODY MASS INDEX: 35.73 KG/M2 | WEIGHT: 255.2 LBS | RESPIRATION RATE: 18 BRPM | SYSTOLIC BLOOD PRESSURE: 128 MMHG

## 2022-04-19 DIAGNOSIS — I48.20 ATRIAL FIBRILLATION, CHRONIC: Primary | ICD-10-CM

## 2022-04-19 PROCEDURE — 99214 OFFICE O/P EST MOD 30 MIN: CPT | Performed by: INTERNAL MEDICINE

## 2022-04-19 PROCEDURE — 93000 ELECTROCARDIOGRAM COMPLETE: CPT | Performed by: INTERNAL MEDICINE

## 2022-05-03 RX ORDER — DILTIAZEM HYDROCHLORIDE 240 MG/1
CAPSULE, EXTENDED RELEASE ORAL
Qty: 90 CAPSULE | Refills: 3 | Status: SHIPPED | OUTPATIENT
Start: 2022-05-03 | End: 2023-04-04

## 2022-05-25 NOTE — PROGRESS NOTES
"Cardiology Clinic Note  Lucian Posey MD, PhD    Subjective:     Encounter Date:04/19/2022      Patient ID: Arya Pedraza is a 56 y.o. male.    Chief Complaint:  Chief Complaint   Patient presents with   • Follow-up       HPI:  I the pleasure to see this 56-year-old gentleman in follow-up today with history of permanent atrial fibrillation, essential hypertension, he is a non-smoker he is not diabetic.  Blood pressure well controlled 128/86 with heart rates in the 60s to 80s, EKG reviewed and interpreted me demonstrates underlying atrial fibrillation with nonspecific interventricular conduction delay and nonspecific ST-T wave abnormalities in the inferolateral leads, he denies any new heart signs or symptoms, he is on aspirin diltiazem lisinopril HCTZ and Bystolic with stable doses, he was evaluated for ablation previously but since he remained asymptomatic and rate controlled this was deferred as he was asymptomatic.  Otherwise he has done well, no new heart failure signs or symptoms no unstable angina no unexplained syncope    Review of systems otherwise negative x14 point review of systems except as mentioned above  Historical data copied forward from previous encounters in EMR including the history, exam, and assessment/plan has been reviewed and is unchanged unless noted otherwise.    Cardiac medicines reviewed with risk, benefits, and necessity of each discussed.    Risk and benefit of cardiac testing reviewed including death heart attack stroke pain bleeding infection need for vascular /cardiovascular surgery were discussed and the patient     Objective:         /86 (BP Location: Left arm, Patient Position: Sitting)   Resp 18   Ht 180.3 cm (70.98\")   Wt 116 kg (255 lb 3.2 oz)   BMI 35.61 kg/m²     Physical Exam  Irregular, no rubs gallops heave or lift  No significant murmurs  No clubbing cyanosis or edema  Normal pulses  Normal cap refill  Warm and dry  Intact grossly  Soft nontender " nondistended  Clear to auscultation  No bruits or JVD  Assessment:         Treated medical conditions  Permanent atrial fibrillation  Essential hypertension  Risk factors for coronary artery disease  [id number] score of 1 on aspirin therapy    Continue aspirin  Continue rate and rhythm control strategy with Bystolic and diltiazem  Afterload reduction with lisinopril HCTZ as well as other medicines  Primary prevention goals discussed  Diet and exercise per HI guidelines  Continue non-smoking  Needs to establish care with PCP for preventative health care and maintenance    Return to clinic 1 year      The pleasure to be involved in this patient's cardiovascular care.  Please call with any questions or concerns  Lucian Posey MD, PhD    Most recent EKG as reviewed and interpreted by me:    ECG 12 Lead    Date/Time: 4/19/2022 11:45 AM  Performed by: Lucian Posey MD  Authorized by: Lucian Posey MD   Comparison: not compared with previous ECG   Rhythm: atrial fibrillation  Rate: normal  Conduction: non-specific intraventricular conduction delay  Other findings: non-specific ST-T wave changes    Clinical impression: abnormal EKG             Most recent echo as reviewed and interpreted by me:      Most recent stress test as reviewed and interpreted by me:      Most recent cardiac catheterization as reviewed interpreted by me:  No results found for this or any previous visit.    The following portions of the patient's history were reviewed and updated as appropriate: allergies, current medications, past family history, past medical history, past social history, past surgical history and problem list.      ROS:  14 point review of systems negative except as mentioned above    Current Outpatient Medications:   •  aspirin (aspirin) 81 MG EC tablet, Take 81 mg by mouth Daily., Disp: , Rfl:   •  lisinopril-hydrochlorothiazide (PRINZIDE,ZESTORETIC) 20-12.5 MG per tablet, TAKE 1 TABLET DAILY, Disp: 90  tablet, Rfl: 3  •  nebivolol (BYSTOLIC) 10 MG tablet, TAKE 1 TABLET DAILY, Disp: 90 tablet, Rfl: 0  •  NON FORMULARY, Bariatric Vitamin, Disp: , Rfl:   •  Cartia  MG 24 hr capsule, TAKE 1 CAPSULE DAILY, Disp: 90 capsule, Rfl: 3  •  Multiple Minerals-Vitamins (CALCIUM-MAGNESIUM-ZINC-D3 PO), CALCIUM + D3 & MAGNESIUM, Disp: , Rfl:     Problem List:  Patient Active Problem List   Diagnosis   • Permanent atrial fibrillation   • Benign essential hypertension   • Morbid obesity (HCC)   • Body mass index 45.0-49.9, adult (HCC)   • Status post bariatric surgery     Past Medical History:  Past Medical History:   Diagnosis Date   • Atrial fibrillation (HCC)    • Colorectal cancer (HCC)    • Foot pain    • Hip pain    • Hypertension    • Obesity    • Paroxysmal atrial flutter (HCC)    • Ventral hernia      Past Surgical History:  Past Surgical History:   Procedure Laterality Date   • ABLATION OF DYSRHYTHMIC FOCUS  07/2013   • APPENDECTOMY     • CARDIOVERSION      2012; 2013   • COLON RESECTION  10/13/2016    [facility]   • KNEE ARTHROSCOPY Right 03/29/2013   • KNEE SURGERY  12/15/2011   • KNEE SURGERY Right     total knee replacement   • VENTRAL/INCISIONAL HERNIA REPAIR  11/13/2012    open procedure     Social History:  Social History     Socioeconomic History   • Marital status:    Tobacco Use   • Smoking status: Never Smoker   • Smokeless tobacco: Never Used   Vaping Use   • Vaping Use: Never used   Substance and Sexual Activity   • Alcohol use: Yes   • Drug use: No   • Sexual activity: Defer     Allergies:  No Known Allergies  Immunizations:  Immunization History   Administered Date(s) Administered   • COVID-19 (PFIZER) PURPLE CAP 03/19/2021, 04/09/2021, 12/22/2021            In-Office Procedure(s):  No orders to display        ASCVD RIsk Score::  The ASCVD Risk score (Era ROSENDO Jacobson, et al., 2013) failed to calculate for the following reasons:    Cannot find a previous HDL lab    Cannot find a previous total  cholesterol lab    Imaging:                 Lab Review:   No visits with results within 6 Month(s) from this visit.   Latest known visit with results is:   Lab on 09/08/2021   Component Date Value   • Iron 09/08/2021 109    • Ferritin 09/08/2021 142.00    • Folate 09/08/2021 10.80    • Magnesium 09/08/2021 2.2    • Methylmalonic Acid 09/08/2021 135    • Disclaimer: 09/08/2021 Comment    • Prealbumin 09/08/2021 23.7    • Glucose 09/08/2021 87    • BUN 09/08/2021 16    • Creatinine 09/08/2021 1.12    • Sodium 09/08/2021 141    • Potassium 09/08/2021 4.1    • Chloride 09/08/2021 108 (A)   • CO2 09/08/2021 25.2    • Calcium 09/08/2021 9.9    • Total Protein 09/08/2021 6.9    • Albumin 09/08/2021 4.70    • ALT (SGPT) 09/08/2021 25    • AST (SGOT) 09/08/2021 30    • Alkaline Phosphatase 09/08/2021 43    • Total Bilirubin 09/08/2021 1.5 (A)   • eGFR Non  Amer 09/08/2021 68    • Globulin 09/08/2021 2.2    • A/G Ratio 09/08/2021 2.1    • BUN/Creatinine Ratio 09/08/2021 14.3    • Anion Gap 09/08/2021 7.8    • Phosphorus 09/08/2021 2.6    • PTH, Intact 09/08/2021 30.8    • Vitamin A 09/08/2021 50.5    • Vitamin E (Alpha Tocophe* 09/08/2021 13.1    • Vitamin E (Gamma Tocophe* 09/08/2021 1.5    • Vitamin K 09/08/2021 0.29    • Zinc 09/08/2021 89    • Testosterone, Total 09/08/2021 536.3    • Testosterone, Free 09/08/2021 10.6    • Vitamin B1, Whole Blood 09/08/2021 116.6    • WBC 09/08/2021 5.00    • RBC 09/08/2021 4.24    • Hemoglobin 09/08/2021 13.2    • Hematocrit 09/08/2021 40.1    • MCV 09/08/2021 94.6    • MCH 09/08/2021 31.1    • MCHC 09/08/2021 32.9    • RDW 09/08/2021 14.0    • RDW-SD 09/08/2021 48.6    • MPV 09/08/2021 9.8    • Platelets 09/08/2021 199    • Neutrophil % 09/08/2021 61.2    • Lymphocyte % 09/08/2021 29.2    • Monocyte % 09/08/2021 5.8    • Eosinophil % 09/08/2021 3.0    • Basophil % 09/08/2021 0.6    • Immature Grans % 09/08/2021 0.2    • Neutrophils, Absolute 09/08/2021 3.06    • Lymphocytes,  Absolute 09/08/2021 1.46    • Monocytes, Absolute 09/08/2021 0.29    • Eosinophils, Absolute 09/08/2021 0.15    • Basophils, Absolute 09/08/2021 0.03    • Immature Grans, Absolute 09/08/2021 0.01    • nRBC 09/08/2021 0.0      Recent labs reviewed and interpreted for clinical significance and application            Level of Care:           Lucian Posey MD  05/25/22  .

## 2022-06-22 RX ORDER — LISINOPRIL AND HYDROCHLOROTHIAZIDE 20; 12.5 MG/1; MG/1
1 TABLET ORAL DAILY
Qty: 90 TABLET | Refills: 3 | Status: SHIPPED | OUTPATIENT
Start: 2022-06-22

## 2022-06-22 RX ORDER — NEBIVOLOL 10 MG/1
TABLET ORAL
Qty: 90 TABLET | Refills: 3 | Status: SHIPPED | OUTPATIENT
Start: 2022-06-22

## 2022-07-13 ENCOUNTER — TELEPHONE (OUTPATIENT)
Dept: BARIATRICS/WEIGHT MGMT | Facility: CLINIC | Age: 57
End: 2022-07-13

## 2022-07-14 ENCOUNTER — TELEPHONE (OUTPATIENT)
Dept: BARIATRICS/WEIGHT MGMT | Facility: CLINIC | Age: 57
End: 2022-07-14

## 2022-09-30 ENCOUNTER — OFFICE VISIT (OUTPATIENT)
Dept: BARIATRICS/WEIGHT MGMT | Facility: CLINIC | Age: 57
End: 2022-09-30

## 2022-09-30 VITALS
SYSTOLIC BLOOD PRESSURE: 132 MMHG | HEIGHT: 71 IN | OXYGEN SATURATION: 96 % | HEART RATE: 69 BPM | RESPIRATION RATE: 15 BRPM | DIASTOLIC BLOOD PRESSURE: 86 MMHG | WEIGHT: 248.4 LBS | BODY MASS INDEX: 34.77 KG/M2

## 2022-09-30 DIAGNOSIS — E66.9 OBESITY, CLASS II, BMI 35-39.9: Primary | ICD-10-CM

## 2022-09-30 PROCEDURE — 99212 OFFICE O/P EST SF 10 MIN: CPT | Performed by: SURGERY

## 2022-09-30 NOTE — PROGRESS NOTES
[facility] BAR SURG [facility] BARIATRIC SURGERY  [address] [place] [address]  [address] [place] [address]  Dept: [phone]  9/30/2022      Arya Pedraza.  [id number]  [id number]  1965  male    Date of last surgery: No surgery foundNo surgery found      Chief Complaint:  Post-Op Bariatric Surgery:   Arya Pedraza is status post procedure listed above  HPI:     Wt Readings from Last 10 Encounters:   09/30/22 113 kg (248 lb 6.4 oz)   08/28/22 111 kg (245 lb)   04/19/22 116 kg (255 lb 3.2 oz)   08/27/21 111 kg (245 lb 12.8 oz)   04/06/21 118 kg (261 lb)   04/06/20 123 kg (271 lb)   02/24/20 119 kg (263 lb 3.2 oz)   08/23/19 121 kg (267 lb 12.8 oz)   08/19/19 119 kg (262 lb 3.2 oz)   03/25/19 120 kg (264 lb)        Today's weight is 113 kg (248 lb 6.4 oz) pounds,@,@ has a  loss of 0 pounds since the last visit and@ weight loss since surgery is 80 pounds. The patient reports a decreased portion size and loss of appetite.      Arya Pedraza denies reflux/heartburn     Diet and Exercise: Diet history reviewed and discussed with the patient. Weight loss/gains to date discussed with the patient.     He reports eating 3 meals per day, a typical portion size of 2 cup, eating 0 snacks per day, drinking 8 or more 8-oz. glasses of water per day, no carbonated beverage consumption and exercising regularly.     Egg whites cheese, sausage    Chicken, noodles,     Meat/veg,     The patient states they are eating 50 grams of protein per day.           Review of Systems    Review of Systems   Constitutional: Negative.    HENT: Negative.    Eyes: Negative.    Respiratory: Negative.    Cardiovascular: Negative.    Gastrointestinal: Negative.    Endocrine: Negative.    Genitourinary: Negative.    Musculoskeletal: Negative.    Skin: Negative.    Allergic/Immunologic: Negative.    Neurological: Negative.    Hematological: Negative.    Psychiatric/Behavioral:  Negative.    Patient Active Problem List   Diagnosis   • Permanent atrial fibrillation   • Benign essential hypertension   • Morbid obesity (HCC)   • Body mass index 45.0-49.9, adult (HCC)   • Status post bariatric surgery       Past Medical History:   Diagnosis Date   • Atrial fibrillation (HCC)    • Colorectal cancer (HCC)    • Foot pain    • Hip pain    • Hypertension    • Obesity    • Paroxysmal atrial flutter (HCC)    • Ventral hernia      Past Surgical History:   Procedure Laterality Date   • KNEE SURGERY  12/15/2011   • VENTRAL/INCISIONAL HERNIA REPAIR  11/13/2012    open procedure   • KNEE ARTHROSCOPY Right 03/29/2013   • ABLATION OF DYSRHYTHMIC FOCUS  07/2013   • COLON RESECTION  10/13/2016    [facility]   • APPENDECTOMY     • CARDIOVERSION      2012; 2013   • KNEE SURGERY Right     total knee replacement      The following portions of the patient's history were reviewed and updated as appropriate: allergies, current medications, past family history, past medical history, past social history, past surgical history and problem list.    Vitals:    09/30/22 1324   BP: 132/86   Pulse: 69   Resp: 15   SpO2: 96%       Physical Exam  Awake and alert  Normal mental status  Normal pulmonary effort  Abdomen appropriate tenderness  Incisions no erythema  Extremities no tenderness or swelling      Assessment:   Post-op, the patient is doing well.     Plan:     Encouraged patient to be sure to get plenty of lean protein per day through small frequent meals all with a protein source.   Activity restrictions: none.   Recommended patient be sure to get at least 70 grams of protein per day by eating small, frequent meals all with high lean protein choices. Be sure to limit/cut back on daily carbohydrate intake. Discussed with the patient the recommended amount of water per day to intake- half of body weight in ounces. Reviewed vitamin requirements. Be sure to do routine exercise, 150 minutes per week minimum, including both  cardio and strength training.     Instructions / Recommendations: dietary counseling recommended, recommended a daily protein intake of  grams, vitamin supplement(s) recommended, recommended exercising at least 150 minutes per week, behavior modifications recommended and instructed to call the office for concerns, questions, or problems.     The patient was instructed to follow up in 12 months.     The patient was counseled regarding. Total time spent face to face was 15 minutes and 15 minutes was spent counseling.

## 2022-10-03 ENCOUNTER — LAB (OUTPATIENT)
Dept: LAB | Facility: HOSPITAL | Age: 57
End: 2022-10-03

## 2022-10-03 DIAGNOSIS — E66.9 OBESITY, UNSPECIFIED CLASSIFICATION, UNSPECIFIED OBESITY TYPE, UNSPECIFIED WHETHER SERIOUS COMORBIDITY PRESENT: Primary | ICD-10-CM

## 2022-10-03 DIAGNOSIS — E66.9 OBESITY, CLASS II, BMI 35-39.9: ICD-10-CM

## 2022-10-03 LAB
ALBUMIN SERPL-MCNC: 4.8 G/DL (ref 3.5–5.2)
ALBUMIN/GLOB SERPL: 2 G/DL
ALP SERPL-CCNC: 48 U/L (ref 39–117)
ALT SERPL W P-5'-P-CCNC: 16 U/L (ref 1–41)
ANION GAP SERPL CALCULATED.3IONS-SCNC: 11.7 MMOL/L (ref 5–15)
AST SERPL-CCNC: 22 U/L (ref 1–40)
BASOPHILS # BLD AUTO: 0.04 10*3/MM3 (ref 0–0.2)
BASOPHILS NFR BLD AUTO: 0.9 % (ref 0–1.5)
BILIRUB SERPL-MCNC: 1.4 MG/DL (ref 0–1.2)
BUN SERPL-MCNC: 25 MG/DL (ref 6–20)
BUN/CREAT SERPL: 20 (ref 7–25)
CALCIUM SPEC-SCNC: 10 MG/DL (ref 8.6–10.5)
CHLORIDE SERPL-SCNC: 103 MMOL/L (ref 98–107)
CO2 SERPL-SCNC: 26.3 MMOL/L (ref 22–29)
CREAT SERPL-MCNC: 1.25 MG/DL (ref 0.76–1.27)
DEPRECATED RDW RBC AUTO: 45 FL (ref 37–54)
EGFRCR SERPLBLD CKD-EPI 2021: 66.7 ML/MIN/1.73
EOSINOPHIL # BLD AUTO: 0.45 10*3/MM3 (ref 0–0.4)
EOSINOPHIL NFR BLD AUTO: 9.7 % (ref 0.3–6.2)
ERYTHROCYTE [DISTWIDTH] IN BLOOD BY AUTOMATED COUNT: 13.1 % (ref 12.3–15.4)
FERRITIN SERPL-MCNC: 203 NG/ML (ref 30–400)
FOLATE SERPL-MCNC: 14 NG/ML (ref 4.78–24.2)
GLOBULIN UR ELPH-MCNC: 2.4 GM/DL
GLUCOSE SERPL-MCNC: 88 MG/DL (ref 65–99)
HCT VFR BLD AUTO: 41.1 % (ref 37.5–51)
HGB BLD-MCNC: 13.8 G/DL (ref 13–17.7)
IMM GRANULOCYTES # BLD AUTO: 0 10*3/MM3 (ref 0–0.05)
IMM GRANULOCYTES NFR BLD AUTO: 0 % (ref 0–0.5)
IRON 24H UR-MRATE: 116 MCG/DL (ref 59–158)
LYMPHOCYTES # BLD AUTO: 1.41 10*3/MM3 (ref 0.7–3.1)
LYMPHOCYTES NFR BLD AUTO: 30.3 % (ref 19.6–45.3)
MAGNESIUM SERPL-MCNC: 2.1 MG/DL (ref 1.6–2.6)
MCH RBC QN AUTO: 31.3 PG (ref 26.6–33)
MCHC RBC AUTO-ENTMCNC: 33.6 G/DL (ref 31.5–35.7)
MCV RBC AUTO: 93.2 FL (ref 79–97)
MONOCYTES # BLD AUTO: 0.4 10*3/MM3 (ref 0.1–0.9)
MONOCYTES NFR BLD AUTO: 8.6 % (ref 5–12)
NEUTROPHILS NFR BLD AUTO: 2.36 10*3/MM3 (ref 1.7–7)
NEUTROPHILS NFR BLD AUTO: 50.5 % (ref 42.7–76)
NRBC BLD AUTO-RTO: 0 /100 WBC (ref 0–0.2)
PHOSPHATE SERPL-MCNC: 2.8 MG/DL (ref 2.5–4.5)
PLATELET # BLD AUTO: 193 10*3/MM3 (ref 140–450)
PMV BLD AUTO: 10 FL (ref 6–12)
POTASSIUM SERPL-SCNC: 4.2 MMOL/L (ref 3.5–5.2)
PREALB SERPL-MCNC: 26 MG/DL (ref 20–40)
PROT SERPL-MCNC: 7.2 G/DL (ref 6–8.5)
PTH-INTACT SERPL-MCNC: 31.7 PG/ML (ref 15–65)
RBC # BLD AUTO: 4.41 10*6/MM3 (ref 4.14–5.8)
SODIUM SERPL-SCNC: 141 MMOL/L (ref 136–145)
WBC NRBC COR # BLD: 4.66 10*3/MM3 (ref 3.4–10.8)

## 2022-10-03 PROCEDURE — 84100 ASSAY OF PHOSPHORUS: CPT

## 2022-10-03 PROCEDURE — 85025 COMPLETE CBC W/AUTO DIFF WBC: CPT

## 2022-10-03 PROCEDURE — 82746 ASSAY OF FOLIC ACID SERUM: CPT

## 2022-10-03 PROCEDURE — 36415 COLL VENOUS BLD VENIPUNCTURE: CPT

## 2022-10-03 PROCEDURE — 83970 ASSAY OF PARATHORMONE: CPT

## 2022-10-03 PROCEDURE — 84402 ASSAY OF FREE TESTOSTERONE: CPT

## 2022-10-03 PROCEDURE — 84630 ASSAY OF ZINC: CPT

## 2022-10-03 PROCEDURE — 83540 ASSAY OF IRON: CPT

## 2022-10-03 PROCEDURE — 82728 ASSAY OF FERRITIN: CPT

## 2022-10-03 PROCEDURE — 84425 ASSAY OF VITAMIN B-1: CPT

## 2022-10-03 PROCEDURE — 80053 COMPREHEN METABOLIC PANEL: CPT

## 2022-10-03 PROCEDURE — 84446 ASSAY OF VITAMIN E: CPT

## 2022-10-03 PROCEDURE — 84403 ASSAY OF TOTAL TESTOSTERONE: CPT

## 2022-10-03 PROCEDURE — 84597 ASSAY OF VITAMIN K: CPT

## 2022-10-03 PROCEDURE — 83921 ORGANIC ACID SINGLE QUANT: CPT

## 2022-10-03 PROCEDURE — 84134 ASSAY OF PREALBUMIN: CPT

## 2022-10-03 PROCEDURE — 84590 ASSAY OF VITAMIN A: CPT

## 2022-10-03 PROCEDURE — 83735 ASSAY OF MAGNESIUM: CPT

## 2022-10-05 LAB
METHYLMALONATE SERPL-SCNC: 166 NMOL/L (ref 0–378)
TESTOST FREE SERPL-MCNC: 10.4 PG/ML (ref 7.2–24)
TESTOST SERPL-MCNC: 438.5 NG/DL (ref 264–916)
ZINC SERPL-MCNC: 88 UG/DL (ref 44–115)

## 2022-10-06 LAB — VIT B1 BLD-SCNC: 146.7 NMOL/L (ref 66.5–200)

## 2022-10-09 LAB
PHYTONADIONE SERPL-MCNC: 0.45 NG/ML (ref 0.1–2.2)
VIT A SERPL-MCNC: 59.8 UG/DL (ref 20.1–62)

## 2022-10-10 LAB
A-TOCOPHEROL VIT E SERPL-MCNC: 10.6 MG/L (ref 7–25.1)
GAMMA TOCOPHEROL SERPL-MCNC: 2.1 MG/L (ref 0.5–5.5)

## 2023-04-04 RX ORDER — DILTIAZEM HYDROCHLORIDE 240 MG/1
CAPSULE, EXTENDED RELEASE ORAL
Qty: 90 CAPSULE | Refills: 3 | Status: SHIPPED | OUTPATIENT
Start: 2023-04-04

## 2023-05-15 RX ORDER — NEBIVOLOL 10 MG/1
TABLET ORAL
Qty: 90 TABLET | Refills: 3 | Status: SHIPPED | OUTPATIENT
Start: 2023-05-15

## 2023-05-15 RX ORDER — LISINOPRIL AND HYDROCHLOROTHIAZIDE 20; 12.5 MG/1; MG/1
TABLET ORAL
Qty: 90 TABLET | Refills: 3 | Status: SHIPPED | OUTPATIENT
Start: 2023-05-15 | End: 2023-05-22 | Stop reason: SDUPTHER

## 2023-05-22 RX ORDER — LISINOPRIL AND HYDROCHLOROTHIAZIDE 20; 12.5 MG/1; MG/1
1 TABLET ORAL DAILY
Qty: 90 TABLET | Refills: 3 | Status: SHIPPED | OUTPATIENT
Start: 2023-05-22

## 2023-05-22 NOTE — TELEPHONE ENCOUNTER
Caller: DELANEY ACOSTA    Relationship: Emergency Contact    Best call back number:  [phone]    Requested Prescriptions:   Requested Prescriptions     Pending Prescriptions Disp Refills   • lisinopril-hydrochlorothiazide (PRINZIDE,ZESTORETIC) 20-12.5 MG per tablet 90 tablet 3     Sig: Take 1 tablet by mouth Daily.        Pharm[facility] where request should be sent: [facility] - [address] [phone] [facility] [phone]      Last office visit with prescribing clinician: 4/19/2022   Last telemedicine visit with prescribing clinician: 5/13/2023   Next office visit with prescribing clinician: 7/19/2023     Additional details provided by patient: [facility] DO NOT SHOW PREVIOUS MEDICATION REQUEST. PLEASE RESEND MEDICATION ORDER.    Does the patient have less than a 3 day supply:  [] Yes  [x] No    Would you like a call back once the refill request has been completed: [] Yes [] No    If the office needs to give you a call back, can they leave a voicemail: [] Yes [] No    Kwabena Yoon Rep   05/22/23 10:07 EDT

## 2023-07-21 ENCOUNTER — LAB (OUTPATIENT)
Dept: LAB | Facility: HOSPITAL | Age: 58
End: 2023-07-21
Payer: COMMERCIAL

## 2023-07-21 DIAGNOSIS — Z82.49 FAMILY HISTORY OF HEART DISEASE: ICD-10-CM

## 2023-07-21 DIAGNOSIS — I48.21 PERMANENT ATRIAL FIBRILLATION: ICD-10-CM

## 2023-07-21 LAB
ALBUMIN SERPL-MCNC: 4.9 G/DL (ref 3.5–5.2)
ALBUMIN/GLOB SERPL: 2.1 G/DL
ALP SERPL-CCNC: 51 U/L (ref 39–117)
ALT SERPL W P-5'-P-CCNC: 15 U/L (ref 1–41)
ANION GAP SERPL CALCULATED.3IONS-SCNC: 10 MMOL/L (ref 5–15)
AST SERPL-CCNC: 27 U/L (ref 1–40)
BILIRUB SERPL-MCNC: 1.7 MG/DL (ref 0–1.2)
BUN SERPL-MCNC: 27 MG/DL (ref 6–20)
BUN/CREAT SERPL: 18.2 (ref 7–25)
CALCIUM SPEC-SCNC: 10.4 MG/DL (ref 8.6–10.5)
CHLORIDE SERPL-SCNC: 104 MMOL/L (ref 98–107)
CHOLEST SERPL-MCNC: 191 MG/DL (ref 0–200)
CO2 SERPL-SCNC: 27 MMOL/L (ref 22–29)
CREAT SERPL-MCNC: 1.48 MG/DL (ref 0.76–1.27)
EGFRCR SERPLBLD CKD-EPI 2021: 54.2 ML/MIN/1.73
GLOBULIN UR ELPH-MCNC: 2.3 GM/DL
GLUCOSE SERPL-MCNC: 94 MG/DL (ref 65–99)
HDLC SERPL-MCNC: 39 MG/DL (ref 40–60)
LDLC SERPL CALC-MCNC: 125 MG/DL (ref 0–100)
LDLC/HDLC SERPL: 3.11 {RATIO}
POTASSIUM SERPL-SCNC: 4.4 MMOL/L (ref 3.5–5.2)
PROT SERPL-MCNC: 7.2 G/DL (ref 6–8.5)
SODIUM SERPL-SCNC: 141 MMOL/L (ref 136–145)
TRIGL SERPL-MCNC: 153 MG/DL (ref 0–150)
VLDLC SERPL-MCNC: 27 MG/DL (ref 5–40)

## 2023-07-21 PROCEDURE — 80053 COMPREHEN METABOLIC PANEL: CPT

## 2023-07-21 PROCEDURE — 80061 LIPID PANEL: CPT

## 2023-07-21 PROCEDURE — 36415 COLL VENOUS BLD VENIPUNCTURE: CPT

## 2023-08-29 ENCOUNTER — HOSPITAL ENCOUNTER (OUTPATIENT)
Dept: CARDIOLOGY | Facility: HOSPITAL | Age: 58
Discharge: HOME | End: 2023-08-29
Admitting: INTERNAL MEDICINE
Payer: COMMERCIAL

## 2023-08-29 VITALS
SYSTOLIC BLOOD PRESSURE: 136 MMHG | HEIGHT: 71 IN | DIASTOLIC BLOOD PRESSURE: 69 MMHG | WEIGHT: 264 LBS | BODY MASS INDEX: 36.96 KG/M2

## 2023-08-29 DIAGNOSIS — I48.21 PERMANENT ATRIAL FIBRILLATION: ICD-10-CM

## 2023-08-29 PROCEDURE — 93306 TTE W/DOPPLER COMPLETE: CPT

## 2023-08-31 LAB
BH CV ECHO MEAS - ACS: 2.31 CM
BH CV ECHO MEAS - AO MAX PG: 2.8 MMHG
BH CV ECHO MEAS - AO MEAN PG: 1.68 MMHG
BH CV ECHO MEAS - AO ROOT DIAM: 3.3 CM
BH CV ECHO MEAS - AO V2 MAX: 83 CM/SEC
BH CV ECHO MEAS - AO V2 VTI: 16 CM
BH CV ECHO MEAS - AVA(I,D): 2.9 CM2
BH CV ECHO MEAS - EDV(CUBED): 79 ML
BH CV ECHO MEAS - EDV(MOD-SP4): 87.7 ML
BH CV ECHO MEAS - EF(MOD-BP): 53 %
BH CV ECHO MEAS - EF(MOD-SP4): 53.1 %
BH CV ECHO MEAS - ESV(CUBED): 30.6 ML
BH CV ECHO MEAS - ESV(MOD-SP4): 41.1 ML
BH CV ECHO MEAS - FS: 27.1 %
BH CV ECHO MEAS - IVS/LVPW: 1.11 CM
BH CV ECHO MEAS - IVSD: 1.28 CM
BH CV ECHO MEAS - LA DIMENSION: 5 CM
BH CV ECHO MEAS - LV DIASTOLIC VOL/BSA (35-75): 37 CM2
BH CV ECHO MEAS - LV MASS(C)D: 187.7 GRAMS
BH CV ECHO MEAS - LV MAX PG: 2.01 MMHG
BH CV ECHO MEAS - LV MEAN PG: 1.15 MMHG
BH CV ECHO MEAS - LV SYSTOLIC VOL/BSA (12-30): 17.3 CM2
BH CV ECHO MEAS - LV V1 MAX: 70.8 CM/SEC
BH CV ECHO MEAS - LV V1 VTI: 13.9 CM
BH CV ECHO MEAS - LVIDD: 4.3 CM
BH CV ECHO MEAS - LVIDS: 3.1 CM
BH CV ECHO MEAS - LVOT AREA: 3.4 CM2
BH CV ECHO MEAS - LVOT DIAM: 2.07 CM
BH CV ECHO MEAS - LVPWD: 1.16 CM
BH CV ECHO MEAS - MR MAX PG: 15.9 MMHG
BH CV ECHO MEAS - MR MAX VEL: 199.4 CM/SEC
BH CV ECHO MEAS - MV E MAX VEL: 66.8 CM/SEC
BH CV ECHO MEAS - MV MAX PG: 2.8 MMHG
BH CV ECHO MEAS - MV MEAN PG: 1.1 MMHG
BH CV ECHO MEAS - MV V2 VTI: 18.1 CM
BH CV ECHO MEAS - MVA(VTI): 2.6 CM2
BH CV ECHO MEAS - PA ACC TIME: 0.12 SEC
BH CV ECHO MEAS - PA V2 MAX: 103.6 CM/SEC
BH CV ECHO MEAS - PI END-D VEL: 90.4 CM/SEC
BH CV ECHO MEAS - RAP SYSTOLE: 3 MMHG
BH CV ECHO MEAS - RV MAX PG: 1.29 MMHG
BH CV ECHO MEAS - RV V1 MAX: 56.8 CM/SEC
BH CV ECHO MEAS - RV V1 VTI: 11.2 CM
BH CV ECHO MEAS - RVDD: 3.8 CM
BH CV ECHO MEAS - RVSP: 13.9 MMHG
BH CV ECHO MEAS - SI(MOD-SP4): 19.6 ML/M2
BH CV ECHO MEAS - SV(LVOT): 47.1 ML
BH CV ECHO MEAS - SV(MOD-SP4): 46.6 ML
BH CV ECHO MEAS - TR MAX PG: 10.9 MMHG
BH CV ECHO MEAS - TR MAX VEL: 158.1 CM/SEC

## 2023-10-23 ENCOUNTER — LAB (OUTPATIENT)
Dept: LAB | Facility: HOSPITAL | Age: 58
End: 2023-10-23
Payer: COMMERCIAL

## 2023-10-23 ENCOUNTER — OFFICE VISIT (OUTPATIENT)
Dept: BARIATRICS/WEIGHT MGMT | Facility: CLINIC | Age: 58
End: 2023-10-23
Payer: COMMERCIAL

## 2023-10-23 VITALS
WEIGHT: 250.4 LBS | DIASTOLIC BLOOD PRESSURE: 70 MMHG | HEIGHT: 71 IN | HEART RATE: 84 BPM | BODY MASS INDEX: 35.06 KG/M2 | OXYGEN SATURATION: 95 % | SYSTOLIC BLOOD PRESSURE: 111 MMHG

## 2023-10-23 DIAGNOSIS — E66.9 OBESITY, CLASS II, BMI 35-39.9: ICD-10-CM

## 2023-10-23 DIAGNOSIS — E66.9 OBESITY, CLASS II, BMI 35-39.9: Primary | ICD-10-CM

## 2023-10-23 LAB
25(OH)D3 SERPL-MCNC: 37.3 NG/ML (ref 30–100)
ALBUMIN SERPL-MCNC: 5 G/DL (ref 3.5–5.2)
ALBUMIN/GLOB SERPL: 2 G/DL
ALP SERPL-CCNC: 44 U/L (ref 39–117)
ALT SERPL W P-5'-P-CCNC: 16 U/L (ref 1–41)
ANION GAP SERPL CALCULATED.3IONS-SCNC: 9.9 MMOL/L (ref 5–15)
AST SERPL-CCNC: 29 U/L (ref 1–40)
BASOPHILS # BLD AUTO: 0.03 10*3/MM3 (ref 0–0.2)
BASOPHILS NFR BLD AUTO: 0.6 % (ref 0–1.5)
BILIRUB SERPL-MCNC: 2.1 MG/DL (ref 0–1.2)
BUN SERPL-MCNC: 21 MG/DL (ref 6–20)
BUN/CREAT SERPL: 15.7 (ref 7–25)
CALCIUM SPEC-SCNC: 10.6 MG/DL (ref 8.6–10.5)
CERULOPLASMIN SERPL-MCNC: 19 MG/DL (ref 16–31)
CHLORIDE SERPL-SCNC: 104 MMOL/L (ref 98–107)
CO2 SERPL-SCNC: 28.1 MMOL/L (ref 22–29)
CREAT SERPL-MCNC: 1.34 MG/DL (ref 0.76–1.27)
DEPRECATED RDW RBC AUTO: 41.4 FL (ref 37–54)
EGFRCR SERPLBLD CKD-EPI 2021: 61 ML/MIN/1.73
EOSINOPHIL # BLD AUTO: 0.07 10*3/MM3 (ref 0–0.4)
EOSINOPHIL NFR BLD AUTO: 1.5 % (ref 0.3–6.2)
ERYTHROCYTE [DISTWIDTH] IN BLOOD BY AUTOMATED COUNT: 12.5 % (ref 12.3–15.4)
FERRITIN SERPL-MCNC: 168 NG/ML (ref 30–400)
FOLATE SERPL-MCNC: 18.3 NG/ML (ref 4.78–24.2)
GLOBULIN UR ELPH-MCNC: 2.5 GM/DL
GLUCOSE SERPL-MCNC: 92 MG/DL (ref 65–99)
HCT VFR BLD AUTO: 43.5 % (ref 37.5–51)
HGB BLD-MCNC: 14.9 G/DL (ref 13–17.7)
IMM GRANULOCYTES # BLD AUTO: 0.01 10*3/MM3 (ref 0–0.05)
IMM GRANULOCYTES NFR BLD AUTO: 0.2 % (ref 0–0.5)
IRON 24H UR-MRATE: 118 MCG/DL (ref 59–158)
LYMPHOCYTES # BLD AUTO: 1.15 10*3/MM3 (ref 0.7–3.1)
LYMPHOCYTES NFR BLD AUTO: 24.7 % (ref 19.6–45.3)
MAGNESIUM SERPL-MCNC: 2.3 MG/DL (ref 1.6–2.6)
MCH RBC QN AUTO: 31.4 PG (ref 26.6–33)
MCHC RBC AUTO-ENTMCNC: 34.3 G/DL (ref 31.5–35.7)
MCV RBC AUTO: 91.6 FL (ref 79–97)
MONOCYTES # BLD AUTO: 0.3 10*3/MM3 (ref 0.1–0.9)
MONOCYTES NFR BLD AUTO: 6.5 % (ref 5–12)
NEUTROPHILS NFR BLD AUTO: 3.09 10*3/MM3 (ref 1.7–7)
NEUTROPHILS NFR BLD AUTO: 66.5 % (ref 42.7–76)
NRBC BLD AUTO-RTO: 0 /100 WBC (ref 0–0.2)
PHOSPHATE SERPL-MCNC: 3 MG/DL (ref 2.5–4.5)
PLATELET # BLD AUTO: 216 10*3/MM3 (ref 140–450)
PMV BLD AUTO: 9.9 FL (ref 6–12)
POTASSIUM SERPL-SCNC: 5.5 MMOL/L (ref 3.5–5.2)
PREALB SERPL-MCNC: 29 MG/DL (ref 20–40)
PROT SERPL-MCNC: 7.5 G/DL (ref 6–8.5)
PTH-INTACT SERPL-MCNC: 33.6 PG/ML (ref 15–65)
RBC # BLD AUTO: 4.75 10*6/MM3 (ref 4.14–5.8)
SODIUM SERPL-SCNC: 142 MMOL/L (ref 136–145)
WBC NRBC COR # BLD: 4.65 10*3/MM3 (ref 3.4–10.8)

## 2023-10-23 PROCEDURE — 82525 ASSAY OF COPPER: CPT

## 2023-10-23 PROCEDURE — 83735 ASSAY OF MAGNESIUM: CPT

## 2023-10-23 PROCEDURE — 82306 VITAMIN D 25 HYDROXY: CPT

## 2023-10-23 PROCEDURE — 83921 ORGANIC ACID SINGLE QUANT: CPT

## 2023-10-23 PROCEDURE — 84100 ASSAY OF PHOSPHORUS: CPT

## 2023-10-23 PROCEDURE — 84446 ASSAY OF VITAMIN E: CPT

## 2023-10-23 PROCEDURE — 80053 COMPREHEN METABOLIC PANEL: CPT

## 2023-10-23 PROCEDURE — 83540 ASSAY OF IRON: CPT

## 2023-10-23 PROCEDURE — 84590 ASSAY OF VITAMIN A: CPT

## 2023-10-23 PROCEDURE — 36415 COLL VENOUS BLD VENIPUNCTURE: CPT

## 2023-10-23 PROCEDURE — 82746 ASSAY OF FOLIC ACID SERUM: CPT

## 2023-10-23 PROCEDURE — 84597 ASSAY OF VITAMIN K: CPT

## 2023-10-23 PROCEDURE — 85025 COMPLETE CBC W/AUTO DIFF WBC: CPT

## 2023-10-23 PROCEDURE — 82390 ASSAY OF CERULOPLASMIN: CPT

## 2023-10-23 PROCEDURE — 84630 ASSAY OF ZINC: CPT

## 2023-10-23 PROCEDURE — 82728 ASSAY OF FERRITIN: CPT

## 2023-10-23 PROCEDURE — 84425 ASSAY OF VITAMIN B-1: CPT

## 2023-10-23 PROCEDURE — 84255 ASSAY OF SELENIUM: CPT

## 2023-10-23 PROCEDURE — 83970 ASSAY OF PARATHORMONE: CPT

## 2023-10-23 PROCEDURE — 84134 ASSAY OF PREALBUMIN: CPT

## 2023-10-23 PROCEDURE — 99213 OFFICE O/P EST LOW 20 MIN: CPT | Performed by: SURGERY

## 2023-10-23 RX ORDER — LORATADINE 10 MG/1
CAPSULE, LIQUID FILLED ORAL
COMMUNITY

## 2023-10-23 NOTE — PROGRESS NOTES
[facility] BAR SURG [facility] BARIATRIC SURGERY  [address] [place] [address]  [address] [place] [address]  Dept: [phone]  10/23/2023      Arya Pedraza.  [id number]  [id number]  1965  male    Date of last surgery: No surgery foundNo surgery found      Chief Complaint:  Post-Op Bariatric Surgery:   Arya Pedraza is status post procedure listed above  HPI:     Wt Readings from Last 10 Encounters:   10/23/23 114 kg (250 lb 6.4 oz)   08/29/23 120 kg (264 lb)   07/19/23 120 kg (264 lb)   01/02/23 112 kg (248 lb)   09/30/22 113 kg (248 lb 6.4 oz)   08/28/22 111 kg (245 lb)   04/19/22 116 kg (255 lb 3.2 oz)   08/27/21 111 kg (245 lb 12.8 oz)   04/06/21 118 kg (261 lb)   04/06/20 123 kg (271 lb)        Today's weight is 114 kg (250 lb 6.4 oz) pounds,BMI@,HE@ has a  loss of 14 pounds since the last visit andHIS@ weight loss since surgery is 72 pounds. The patient reports a decreased portion size and loss of appetite.      Arya Pedraza denies reflux/heartburn, nausea, vomiting, diarrhea, constipation, and dysphagia     Diet and Exercise: Diet history reviewed and discussed with the patient. Weight loss/gains to date discussed with the patient.     He reports eating 3 meals per day, a typical portion size of 1 cup, eating 1 snacks per day, drinking 60 or more 8-oz. glasses of water per day, no carbonated beverage consumption and exercising regularly.       The patient states they are eating 80 grams of protein per day.       Protein shake in morning,     Lunch at 12    Supper at 5 - meat and veg  Takes multivitamin        Review of Systems    Review of Systems   Constitutional: Negative.    HENT: Negative.    Eyes: Negative.    Respiratory: Negative.    Cardiovascular: Negative.    Gastrointestinal: Negative.    Endocrine: Negative.    Genitourinary: Negative.    Musculoskeletal: Negative.    Skin: Negative.    Allergic/Immunologic: Negative.     Neurological: Negative.    Hematological: Negative.    Psychiatric/Behavioral: Negative.    Patient Active Problem List   Diagnosis    Permanent atrial fibrillation    Benign essential hypertension    Morbid obesity    Body mass index 45.0-49.9, adult    Status post bariatric surgery       Past Medical History:   Diagnosis Date    Atrial fibrillation     Colorectal cancer     Foot pain     Hip pain     Hypertension     Obesity     Paroxysmal atrial flutter     Ventral hernia      Past Surgical History:   Procedure Laterality Date    KNEE SURGERY  12/15/2011    VENTRAL/INCISIONAL HERNIA REPAIR  11/13/2012    open procedure    KNEE ARTHROSCOPY Right 03/29/2013    ABLATION OF DYSRHYTHMIC FOCUS  07/2013    COLON RESECTION  10/13/2016    u of [facility]    APPENDECTOMY      CARDIOVERSION      2012; 2013    KNEE SURGERY Right     total knee replacement      The following portions of the patient's history were reviewed and updated as appropriate: allergies, current medications, past family history, past medical history, past social history, past surgical history, and problem list.    Vitals:    10/23/23 1025   BP: 111/70   Pulse: 84   SpO2: 95%       Physical Exam  Awake and alert  Normal mental status  Normal pulmonary effort  Abdomen appropriate tenderness  Incisions no erythema  Extremities no tenderness or swelling      Assessment:       Bmi 34.9   S/p gastric sleeve  Htn  Atrial fib      Post-op, the patient is doing very well. He has had satisfactory weight loss and exercises regularly..     Plan:   His labs indicate some renal insufficiency.  I have asked him to stop his lisinopril and continue his Bystolic.  I want him to follow-up with a primary care physician or his cardiologist soon.  Encouraged patient to be sure to get plenty of lean protein per day through small frequent meals all with a protein source.   Activity restrictions: none.   Recommended patient be sure to get at least 70 grams of protein per day by eating  small, frequent meals all with high lean protein choices. Be sure to limit/cut back on daily carbohydrate intake. Discussed with the patient the recommended amount of water per day to intake- half of body weight in ounces. Reviewed vitamin requirements. Be sure to do routine exercise, 150 minutes per week minimum, including both cardio and strength training.     Instructions / Recommendations: dietary counseling recommended, recommended a daily protein intake of  grams, vitamin supplement(s) recommended, recommended exercising at least 150 minutes per week, behavior modifications recommended and instructed to call the office for concerns, questions, or problems.     The patient was instructed to follow up in 12 months.     The patient was counseled regarding. Total time spent face to face was 15 minutes and 15 minutes was spent counseling.

## 2023-10-24 ENCOUNTER — TELEPHONE (OUTPATIENT)
Dept: BARIATRICS/WEIGHT MGMT | Facility: CLINIC | Age: 58
End: 2023-10-24
Payer: COMMERCIAL

## 2023-10-24 ENCOUNTER — LAB (OUTPATIENT)
Dept: LAB | Facility: HOSPITAL | Age: 58
End: 2023-10-24
Payer: COMMERCIAL

## 2023-10-24 DIAGNOSIS — E66.9 OBESITY, CLASS II, BMI 35-39.9: ICD-10-CM

## 2023-10-24 LAB — POTASSIUM SERPL-SCNC: 4.3 MMOL/L (ref 3.5–5.2)

## 2023-10-24 PROCEDURE — 84132 ASSAY OF SERUM POTASSIUM: CPT

## 2023-10-24 NOTE — TELEPHONE ENCOUNTER
"Per Dr. Avitia via secure chat:     \"His potassium was elevated at 5.5. could you please call him to come in and repeat it? Sometimes there can be a lab error due to something called hemolysis. In the meantime, he should stuff is lisinopril/hydrochlorothiazide and get with his medical doctor to evaluate his medication's and kidney function\"    i.e. - diligently take lisinopril/hydrochlorathiazide.     Left ms for pt to call office.     [facility]   "

## 2023-10-24 NOTE — TELEPHONE ENCOUNTER
Pt called back and will go to [facility] and repeat potassium level. He stated he does not have a PCP at this time to manage his blood pressure meds and kidney function.

## 2023-10-26 LAB
METHYLMALONATE SERPL-SCNC: 148 NMOL/L (ref 0–378)
PHYTONADIONE SERPL-MCNC: 0.19 NG/ML (ref 0.1–2.2)
SELENIUM SERPL-MCNC: 128 UG/L (ref 93–198)

## 2023-10-27 LAB
A-TOCOPHEROL VIT E SERPL-MCNC: 13.2 MG/L (ref 7–25.1)
GAMMA TOCOPHEROL SERPL-MCNC: 1.8 MG/L (ref 0.5–5.5)
VIT A SERPL-MCNC: 64.7 UG/DL (ref 20.1–62)
VIT B1 BLD-SCNC: 124.5 NMOL/L (ref 66.5–200)

## 2023-11-01 LAB — ZINC SERPL-MCNC: 81 UG/DL (ref 44–115)

## 2023-11-02 LAB — COPPER SERPL-MCNC: 90 UG/DL (ref 69–132)

## 2023-11-21 ENCOUNTER — LAB (OUTPATIENT)
Dept: FAMILY MEDICINE CLINIC | Facility: CLINIC | Age: 58
End: 2023-11-21
Payer: COMMERCIAL

## 2023-11-21 ENCOUNTER — OFFICE VISIT (OUTPATIENT)
Dept: FAMILY MEDICINE CLINIC | Facility: CLINIC | Age: 58
End: 2023-11-21
Payer: COMMERCIAL

## 2023-11-21 VITALS
WEIGHT: 253.4 LBS | BODY MASS INDEX: 35.48 KG/M2 | RESPIRATION RATE: 16 BRPM | OXYGEN SATURATION: 98 % | HEIGHT: 71 IN | HEART RATE: 66 BPM | DIASTOLIC BLOOD PRESSURE: 90 MMHG | SYSTOLIC BLOOD PRESSURE: 130 MMHG

## 2023-11-21 DIAGNOSIS — I10 BENIGN ESSENTIAL HYPERTENSION: ICD-10-CM

## 2023-11-21 DIAGNOSIS — N18.31 STAGE 3A CHRONIC KIDNEY DISEASE: ICD-10-CM

## 2023-11-21 DIAGNOSIS — Z12.5 SCREENING PSA (PROSTATE SPECIFIC ANTIGEN): ICD-10-CM

## 2023-11-21 DIAGNOSIS — E83.52 HYPERCALCEMIA: ICD-10-CM

## 2023-11-21 DIAGNOSIS — I48.21 PERMANENT ATRIAL FIBRILLATION: ICD-10-CM

## 2023-11-21 DIAGNOSIS — Z00.00 ENCOUNTER FOR ANNUAL PHYSICAL EXAM: Primary | ICD-10-CM

## 2023-11-21 DIAGNOSIS — E66.9 OBESITY (BMI 35.0-39.9 WITHOUT COMORBIDITY): ICD-10-CM

## 2023-11-21 PROBLEM — Z85.048 HISTORY OF RECTAL CANCER: Status: ACTIVE | Noted: 2023-11-21

## 2023-11-21 LAB — HBA1C MFR BLD: 5.2 % (ref 4.8–5.6)

## 2023-11-21 PROCEDURE — 86803 HEPATITIS C AB TEST: CPT | Performed by: STUDENT IN AN ORGANIZED HEALTH CARE EDUCATION/TRAINING PROGRAM

## 2023-11-21 PROCEDURE — 84155 ASSAY OF PROTEIN SERUM: CPT | Performed by: STUDENT IN AN ORGANIZED HEALTH CARE EDUCATION/TRAINING PROGRAM

## 2023-11-21 PROCEDURE — 84165 PROTEIN E-PHORESIS SERUM: CPT | Performed by: STUDENT IN AN ORGANIZED HEALTH CARE EDUCATION/TRAINING PROGRAM

## 2023-11-21 PROCEDURE — G0103 PSA SCREENING: HCPCS | Performed by: STUDENT IN AN ORGANIZED HEALTH CARE EDUCATION/TRAINING PROGRAM

## 2023-11-21 PROCEDURE — 83036 HEMOGLOBIN GLYCOSYLATED A1C: CPT | Performed by: STUDENT IN AN ORGANIZED HEALTH CARE EDUCATION/TRAINING PROGRAM

## 2023-11-21 PROCEDURE — 36415 COLL VENOUS BLD VENIPUNCTURE: CPT | Performed by: STUDENT IN AN ORGANIZED HEALTH CARE EDUCATION/TRAINING PROGRAM

## 2023-11-21 PROCEDURE — 84166 PROTEIN E-PHORESIS/URINE/CSF: CPT | Performed by: STUDENT IN AN ORGANIZED HEALTH CARE EDUCATION/TRAINING PROGRAM

## 2023-11-21 PROCEDURE — 84156 ASSAY OF PROTEIN URINE: CPT | Performed by: STUDENT IN AN ORGANIZED HEALTH CARE EDUCATION/TRAINING PROGRAM

## 2023-11-21 NOTE — PROGRESS NOTES
"Chief Complaint  Establish Care (Concerns about labs )    Subjective        Arya Pedraza presents to [facility]  History of Present Illness  Meño is a 57-year-old gentleman with atrial fibrillation, hypertension, obesity, status post bariatric surgery, history of rectal cancer who presents today to establish care with me.  He is a new patient to me that I do not know.    Hypertension  Takes Prinzide 20-12.5 without difficulties.  States that this is easy for him to take.  Blood pressure is up today but he states that he was having trouble finding the location of the practice and was frustrated and anxious.  Previous blood pressures have been in the normal to low range per my review.  Denies any headaches, changes in vision, chest pain.    Atrial fibrillation  Follows with cardiology.  Is on Bystolic 10 mg.  Is also on Cartia 240 mg.  Is also on aspirin 81 mg.  Discussed with him about anticoagulation, stated that the cardiologist would start at age 63.  Denies any palpitations or shortness of breath.    History of bariatric surgery  Follows with bariatric surgeon and gets routine vitamin labs.        Objective   Vital Signs:  /90 (BP Location: Left arm, Patient Position: Sitting, Cuff Size: Adult)   Pulse 66   Resp 16   Ht 180.3 cm (71\")   Wt 115 kg (253 lb 6.4 oz)   SpO2 98%   BMI 35.34 kg/m²   Estimated body mass index is 35.34 kg/m² as calculated from the following:    Height as of this encounter: 180.3 cm (71\").    Weight as of this encounter: 115 kg (253 lb 6.4 oz).       Class 2 Severe Obesity (BMI >=35 and <=39.9). Obesity-related health conditions include the following: hypertension. Obesity is unchanged. BMI is is above average; BMI management plan is completed. We discussed portion control and increasing exercise.      Physical Exam  Vitals and nursing note reviewed.   Constitutional:       General: He is not in acute distress.     Appearance: Normal " appearance. He is obese. He is not toxic-appearing.   HENT:      Head: Normocephalic and atraumatic.      Right Ear: Tympanic membrane, ear canal and external ear normal.      Left Ear: Tympanic membrane, ear canal and external ear normal.      Nose: Nose normal. No congestion or rhinorrhea.      Mouth/Throat:      Mouth: Mucous membranes are moist.      Pharynx: No oropharyngeal exudate.   Eyes:      General: No scleral icterus.        Right eye: No discharge.         Left eye: No discharge.      Extraocular Movements: Extraocular movements intact.      Conjunctiva/sclera: Conjunctivae normal.      Pupils: Pupils are equal, round, and reactive to light.   Cardiovascular:      Rate and Rhythm: Normal rate and regular rhythm.      Pulses: Normal pulses.      Heart sounds: Normal heart sounds. No murmur heard.  Pulmonary:      Effort: Pulmonary effort is normal. No respiratory distress.      Breath sounds: Normal breath sounds. No wheezing.   Abdominal:      General: Abdomen is flat. Bowel sounds are normal. There is no distension.      Palpations: Abdomen is soft.      Tenderness: There is no abdominal tenderness. There is no guarding.   Musculoskeletal:         General: No swelling, tenderness or deformity. Normal range of motion.      Cervical back: Normal range of motion and neck supple. No rigidity or tenderness.   Lymphadenopathy:      Cervical: No cervical adenopathy.   Skin:     General: Skin is warm.      Capillary Refill: Capillary refill takes less than 2 seconds.   Neurological:      General: No focal deficit present.      Mental Status: He is alert and oriented to person, place, and time. Mental status is at baseline.      Cranial Nerves: No cranial nerve deficit.      Gait: Gait normal.   Psychiatric:         Mood and Affect: Mood normal.         Behavior: Behavior normal.         Thought Content: Thought content normal.         Judgment: Judgment normal.        Result Review :  The following data was  reviewed by: Stuart Bourne MD on 11/21/2023:  Common labs          10/23/2023    11:18 10/24/2023    09:39 11/21/2023    14:37   Common Labs   Glucose 92      BUN 21      Creatinine 1.34      Sodium 142      Potassium 5.5  4.3     Chloride 104      Calcium 10.6      Albumin 5.0      Total Bilirubin 2.1      Alkaline Phosphatase 44      AST (SGOT) 29      ALT (SGPT) 16      WBC 4.65      Hemoglobin 14.9      Hematocrit 43.5      Platelets 216      Hemoglobin A1C   5.20    PSA   1.910      Data reviewed : Previous notes             Assessment and Plan   Diagnoses and all orders for this visit:    1. Encounter for annual physical exam (Primary)  -     Hemoglobin A1c  -     Hepatitis C antibody    2. Permanent atrial fibrillation    3. Benign essential hypertension    4. Obesity (BMI 35.0-39.9 without comorbidity)    5. Screening PSA (prostate specific antigen)  -     PSA SCREENING    6. Stage 3a chronic kidney disease  -     Protein Elec + Interp, Serum  -     Protein Electrophoresis, Random Urine - Urine, Clean Catch    7. Hypercalcemia  -     Protein Elec + Interp, Serum  -     Protein Electrophoresis, Random Urine - Urine, Clean Catch    Meño is a 57-year-old gentleman with atrial fibrillation, hypertension who presents today to establish care with me.  On reflection of his labs it was noted that his hypercalcemia on some different labs and also with his stage III chronic kidney disease, based on this we will get protein electrophoresis and serum electrophoresis to further evaluate for multiple myeloma or other MGUS.  He does not have any bone pain or anemia.    Hypertension  Blood pressure elevated today at this visit.  Previous review of the records show that his blood pressure has been lower on the last 3-4 visits.  He was anxious and did not find the location of this practice in a reasonable time still can be explained for his blood pressure.  We will continue his Prinzide.    Atrial fibrillation  Currently  managed by cardiology.  We will defer to their management.  Is currently on Cartia 240 mg, Bystolic 10 mg and aspirin.  [id number] is low.  Is not currently on anticoagulation.         Follow Up   Return in about 1 year (around 11/21/2024) for Annual physical.  Patient was given instructions and counseling regarding his condition or for health maintenance advice. Please see specific information pulled into the AVS if appropriate.

## 2023-11-22 LAB
HCV AB SER DONR QL: NORMAL
PSA SERPL-MCNC: 1.91 NG/ML (ref 0–4)

## 2023-11-27 ENCOUNTER — TELEPHONE (OUTPATIENT)
Dept: FAMILY MEDICINE CLINIC | Facility: CLINIC | Age: 58
End: 2023-11-27
Payer: COMMERCIAL

## 2023-11-27 LAB
ALBUMIN MFR UR ELPH: 19.7 %
ALBUMIN SERPL ELPH-MCNC: 3.9 G/DL (ref 2.9–4.4)
ALBUMIN/GLOB SERPL: 1.3 {RATIO} (ref 0.7–1.7)
ALPHA1 GLOB MFR UR ELPH: 3.6 %
ALPHA1 GLOB SERPL ELPH-MCNC: 0.3 G/DL (ref 0–0.4)
ALPHA2 GLOB MFR UR ELPH: 25.7 %
ALPHA2 GLOB SERPL ELPH-MCNC: 0.8 G/DL (ref 0.4–1)
B-GLOBULIN MFR UR ELPH: 30.5 %
B-GLOBULIN SERPL ELPH-MCNC: 1.1 G/DL (ref 0.7–1.3)
GAMMA GLOB MFR UR ELPH: 20.6 %
GAMMA GLOB SERPL ELPH-MCNC: 0.9 G/DL (ref 0.4–1.8)
GLOBULIN SER CALC-MCNC: 3 G/DL (ref 2.2–3.9)
LABORATORY COMMENT REPORT: NORMAL
LABORATORY COMMENT REPORT: NORMAL
M PROTEIN MFR UR ELPH: NORMAL %
M PROTEIN SERPL ELPH-MCNC: NORMAL G/DL
PROT PATTERN SERPL ELPH-IMP: NORMAL
PROT SERPL-MCNC: 6.9 G/DL (ref 6–8.5)
PROT UR-MCNC: 20.8 MG/DL

## 2023-11-27 NOTE — TELEPHONE ENCOUNTER
----- Message from Stuart Bourne MD sent at 11/27/2023  7:30 AM EST -----  Please let patient know that his labs overall look well.  We are still waiting two results.  We will update them when we get this.

## 2023-11-27 NOTE — TELEPHONE ENCOUNTER
----- Message from Stuart Bourne MD sent at 11/27/2023  2:10 PM EST -----  Please let patient know that the electrophoresis labs did not show any concerns of malignancy. We will continue to watch his kidney function and calcium level to see if they are improved at next year's physical

## 2024-02-15 ENCOUNTER — HOSPITAL ENCOUNTER (OUTPATIENT)
Dept: CT IMAGING | Facility: HOSPITAL | Age: 59
Discharge: HOME | End: 2024-02-15
Admitting: INTERNAL MEDICINE

## 2024-02-15 DIAGNOSIS — Z82.49 FAMILY HISTORY OF HEART DISEASE: ICD-10-CM

## 2024-02-15 PROCEDURE — 75571 CT HRT W/O DYE W/CA TEST: CPT

## 2024-03-01 RX ORDER — DILTIAZEM HYDROCHLORIDE 240 MG/1
CAPSULE, EXTENDED RELEASE ORAL
Qty: 90 CAPSULE | Refills: 3 | Status: SHIPPED | OUTPATIENT
Start: 2024-03-01

## 2024-04-09 RX ORDER — LISINOPRIL AND HYDROCHLOROTHIAZIDE 20; 12.5 MG/1; MG/1
1 TABLET ORAL DAILY
Qty: 90 TABLET | Refills: 3 | Status: SHIPPED | OUTPATIENT
Start: 2024-04-09

## 2024-04-09 RX ORDER — NEBIVOLOL 10 MG/1
TABLET ORAL
Qty: 90 TABLET | Refills: 3 | Status: SHIPPED | OUTPATIENT
Start: 2024-04-09

## 2024-07-30 ENCOUNTER — OFFICE VISIT (OUTPATIENT)
Dept: CARDIOLOGY | Facility: CLINIC | Age: 59
End: 2024-07-30
Payer: COMMERCIAL

## 2024-07-30 VITALS
BODY MASS INDEX: 36.26 KG/M2 | WEIGHT: 259 LBS | RESPIRATION RATE: 16 BRPM | DIASTOLIC BLOOD PRESSURE: 85 MMHG | SYSTOLIC BLOOD PRESSURE: 134 MMHG | HEART RATE: 72 BPM | OXYGEN SATURATION: 97 % | HEIGHT: 71 IN

## 2024-07-30 DIAGNOSIS — I48.21 PERMANENT ATRIAL FIBRILLATION: ICD-10-CM

## 2024-07-30 DIAGNOSIS — I10 BENIGN ESSENTIAL HYPERTENSION: Primary | ICD-10-CM

## 2024-07-30 PROCEDURE — 99213 OFFICE O/P EST LOW 20 MIN: CPT | Performed by: NURSE PRACTITIONER

## 2024-07-30 PROCEDURE — 93000 ELECTROCARDIOGRAM COMPLETE: CPT | Performed by: NURSE PRACTITIONER

## 2024-07-30 NOTE — PROGRESS NOTES
Cardiology Office Follow Up Visit      Primary Care Provider:  Stuart Bourne MD    Reason for f/u:     Atrial Fibrillation  Hypertension      Subjective     CC:    Denies chest pain or dyspnea    History of Present Illness       Arya Pedraza is a 58 y.o. male.  Patient is a pleasant 59-year-old male who is here today for follow-up.    He is known to have atrial fibrillation that is permanent, hypertension.  He had previous bariatric surgery in 2018.  Treatment for his atrial fibrillation has been elected at rate control his [id number] score is 1 and he has been treated with aspirin.    Patient underwent echocardiogram In August of last year.  Ejection fraction was 50 to 55%.  There was grade 1A diastolic dysfunction.  No significant valvular flow abnormalities.    Patient reports he is very active he plays golf and goes to the gym multiple days a week.  He says he walks on the treadmill up to an hour at a time with no symptoms.    He denies any current or recent chest pain, dyspnea, PND, orthopnea, palpitations, near-syncope or feelings of his heart racing.        ASSESSMENT/PLAN:      Diagnoses and all orders for this visit:    1. Benign essential hypertension (Primary)    2. Permanent atrial fibrillation            MEDICAL DECISION MAKING:    Patient appears to be well compensated.  He remains active.  He is not having any cardiac symptoms.  Blood pressure stable.    EKG shows atrial fibrillation with controlled rate.    Patient had recent CT calcium score that was 0.  The patient has a chads Vascor of 1.  He is anticoagulated with aspirin.  We have discussed anticoagulation again and he anticipates starting anticoagulation with Eliquis or Xarelto when he turns 63.  He is having no new symptoms.  We will plan on seeing him back for 6 follow-up in 1 year.  If he develops any new or worsening problems of asked him to contact the office sooner.          Past Medical History:   Diagnosis Date    Atrial  "fibrillation     Colorectal cancer     Foot pain     Hip pain     Hypertension     Obesity     Paroxysmal atrial flutter     Ventral hernia        Past Surgical History:   Procedure Laterality Date    ABLATION OF DYSRHYTHMIC FOCUS  07/2013    APPENDECTOMY      CARDIOVERSION      2012; 2013    COLON RESECTION  10/13/2016    u of L    KNEE ARTHROSCOPY Right 03/29/2013    KNEE SURGERY  12/15/2011    KNEE SURGERY Right     total knee replacement    VENTRAL/INCISIONAL HERNIA REPAIR  11/13/2012    open procedure         Current Outpatient Medications:     aspirin (aspirin) 81 MG EC tablet, Take 1 tablet by mouth Daily., Disp: , Rfl:     Cartia  MG 24 hr capsule, TAKE 1 CAPSULE DAILY, Disp: 90 capsule, Rfl: 3    lisinopril-hydrochlorothiazide (PRINZIDE,ZESTORETIC) 20-12.5 MG per tablet, TAKE 1 TABLET DAILY, Disp: 90 tablet, Rfl: 3    Loratadine 10 MG capsule, Take  by mouth., Disp: , Rfl:     nebivolol (BYSTOLIC) 10 MG tablet, TAKE 1 TABLET DAILY, Disp: 90 tablet, Rfl: 3    Social History     Socioeconomic History    Marital status:    Tobacco Use    Smoking status: Never    Smokeless tobacco: Never   Vaping Use    Vaping status: Never Used   Substance and Sexual Activity    Alcohol use: Yes    Drug use: No    Sexual activity: Yes     Partners: Female       Family History   Problem Relation Age of Onset    Heart disease Mother     Cancer Father         bladder       The following portions of the patient's history were reviewed and updated as appropriate: allergies, current medications, past family history, past medical history, past social history, past surgical history and problem list.    Review of Systems   All other systems reviewed and are negative.      Pertinent items are noted in HPI, all other systems reviewed and negative    /85 (BP Location: Right arm, Patient Position: Sitting, Cuff Size: Large Adult)   Pulse 72   Resp 16   Ht 180.3 cm (70.98\")   Wt 117 kg (259 lb)   SpO2 97%   BMI " 36.14 kg/m² .  Objective     Vitals reviewed.   Constitutional:       General: Not in acute distress.     Appearance: Normal appearance. Well-developed.   Eyes:      Pupils: Pupils are equal, round, and reactive to light.   HENT:      Head: Normocephalic and atraumatic.   Neck:      Vascular: No JVD.   Pulmonary:      Effort: Pulmonary effort is normal.      Breath sounds: Normal breath sounds.   Cardiovascular:      Normal rate. Irregularly irregular rhythm.   Edema:     Peripheral edema absent.   Abdominal:      General: There is no distension.      Palpations: Abdomen is soft.      Tenderness: There is no abdominal tenderness.   Musculoskeletal: Normal range of motion.      Cervical back: Normal range of motion and neck supple. Skin:     General: Skin is warm and dry.   Neurological:      Mental Status: Alert and oriented to person, place, and time.             ECG 12 Lead    Date/Time: 7/30/2024 1:06 PM  Performed by: Yuridia Duque APRN    Authorized by: Yuridia Duque APRN  Comparison: compared with previous ECG   Similar to previous ECG  Rhythm: atrial fibrillation  BPM: 72  Conduction: incomplete right bundle branch block    Clinical impression: abnormal EKG          EKG ordered by and reviewed by me in office

## 2024-11-22 ENCOUNTER — OFFICE VISIT (OUTPATIENT)
Dept: FAMILY MEDICINE CLINIC | Facility: CLINIC | Age: 59
End: 2024-11-22
Payer: COMMERCIAL

## 2024-11-22 ENCOUNTER — LAB (OUTPATIENT)
Dept: FAMILY MEDICINE CLINIC | Facility: CLINIC | Age: 59
End: 2024-11-22
Payer: COMMERCIAL

## 2024-11-22 VITALS
RESPIRATION RATE: 18 BRPM | HEIGHT: 71 IN | HEART RATE: 81 BPM | SYSTOLIC BLOOD PRESSURE: 138 MMHG | WEIGHT: 272.4 LBS | BODY MASS INDEX: 38.14 KG/M2 | DIASTOLIC BLOOD PRESSURE: 84 MMHG | OXYGEN SATURATION: 97 %

## 2024-11-22 DIAGNOSIS — Z12.5 SCREENING PSA (PROSTATE SPECIFIC ANTIGEN): ICD-10-CM

## 2024-11-22 DIAGNOSIS — Z00.00 ENCOUNTER FOR ANNUAL PHYSICAL EXAM: Primary | ICD-10-CM

## 2024-11-22 LAB
BASOPHILS # BLD AUTO: 0.04 10*3/MM3 (ref 0–0.2)
BASOPHILS NFR BLD AUTO: 0.6 % (ref 0–1.5)
DEPRECATED RDW RBC AUTO: 44.5 FL (ref 37–54)
EOSINOPHIL # BLD AUTO: 0.16 10*3/MM3 (ref 0–0.4)
EOSINOPHIL NFR BLD AUTO: 2.2 % (ref 0.3–6.2)
ERYTHROCYTE [DISTWIDTH] IN BLOOD BY AUTOMATED COUNT: 12.8 % (ref 12.3–15.4)
HBA1C MFR BLD: 5.3 % (ref 4.8–5.6)
HCT VFR BLD AUTO: 45.3 % (ref 37.5–51)
HGB BLD-MCNC: 14.8 G/DL (ref 13–17.7)
IMM GRANULOCYTES # BLD AUTO: 0.02 10*3/MM3 (ref 0–0.05)
IMM GRANULOCYTES NFR BLD AUTO: 0.3 % (ref 0–0.5)
LYMPHOCYTES # BLD AUTO: 1.72 10*3/MM3 (ref 0.7–3.1)
LYMPHOCYTES NFR BLD AUTO: 23.9 % (ref 19.6–45.3)
MCH RBC QN AUTO: 31.2 PG (ref 26.6–33)
MCHC RBC AUTO-ENTMCNC: 32.7 G/DL (ref 31.5–35.7)
MCV RBC AUTO: 95.4 FL (ref 79–97)
MONOCYTES # BLD AUTO: 0.6 10*3/MM3 (ref 0.1–0.9)
MONOCYTES NFR BLD AUTO: 8.3 % (ref 5–12)
NEUTROPHILS NFR BLD AUTO: 4.66 10*3/MM3 (ref 1.7–7)
NEUTROPHILS NFR BLD AUTO: 64.7 % (ref 42.7–76)
NRBC BLD AUTO-RTO: 0 /100 WBC (ref 0–0.2)
PLATELET # BLD AUTO: 205 10*3/MM3 (ref 140–450)
PMV BLD AUTO: 9.8 FL (ref 6–12)
RBC # BLD AUTO: 4.75 10*6/MM3 (ref 4.14–5.8)
WBC NRBC COR # BLD AUTO: 7.2 10*3/MM3 (ref 3.4–10.8)

## 2024-11-22 PROCEDURE — 80053 COMPREHEN METABOLIC PANEL: CPT | Performed by: STUDENT IN AN ORGANIZED HEALTH CARE EDUCATION/TRAINING PROGRAM

## 2024-11-22 PROCEDURE — G0103 PSA SCREENING: HCPCS | Performed by: STUDENT IN AN ORGANIZED HEALTH CARE EDUCATION/TRAINING PROGRAM

## 2024-11-22 PROCEDURE — 99396 PREV VISIT EST AGE 40-64: CPT | Performed by: STUDENT IN AN ORGANIZED HEALTH CARE EDUCATION/TRAINING PROGRAM

## 2024-11-22 PROCEDURE — 85025 COMPLETE CBC W/AUTO DIFF WBC: CPT | Performed by: STUDENT IN AN ORGANIZED HEALTH CARE EDUCATION/TRAINING PROGRAM

## 2024-11-22 PROCEDURE — 36415 COLL VENOUS BLD VENIPUNCTURE: CPT | Performed by: STUDENT IN AN ORGANIZED HEALTH CARE EDUCATION/TRAINING PROGRAM

## 2024-11-22 PROCEDURE — 83036 HEMOGLOBIN GLYCOSYLATED A1C: CPT | Performed by: STUDENT IN AN ORGANIZED HEALTH CARE EDUCATION/TRAINING PROGRAM

## 2024-11-22 PROCEDURE — 99213 OFFICE O/P EST LOW 20 MIN: CPT | Performed by: STUDENT IN AN ORGANIZED HEALTH CARE EDUCATION/TRAINING PROGRAM

## 2024-11-22 PROCEDURE — 80061 LIPID PANEL: CPT | Performed by: STUDENT IN AN ORGANIZED HEALTH CARE EDUCATION/TRAINING PROGRAM

## 2024-11-22 NOTE — PROGRESS NOTES
"Chief Complaint  Annual Exam    Subjective        Arya Pedraza presents to [facility]  History of Present Illness  Meño is a 58-year-old with atrial fibrillation who presents today for Charity physical.  Reviewed his past medical, social, surgical, family history.  He has a history of bariatric surgery.  Has been in a okay with that.  Does not follow with them any longer.  He also has a history of colorectal cancer status post excision.  No smoking history or vaping history.  Mother with heart disease.      Overall he states that he does do pretty well.  He is has occasional sinus issues and has had a lot of sinus drainage recently.  States his blood pressure at home is usually 108/68.  Follows with cardiology for his A-fib.  [id number] is 2.  Objective   Vital Signs:  /84   Pulse 81   Resp 18   Ht 180.3 cm (70.98\")   Wt 124 kg (272 lb 6.4 oz)   SpO2 97%   BMI 38.01 kg/m²   Estimated body mass index is 38.01 kg/m² as calculated from the following:    Height as of this encounter: 180.3 cm (70.98\").    Weight as of this encounter: 124 kg (272 lb 6.4 oz).          Physical Exam  Vitals and nursing note reviewed.   Constitutional:       General: He is not in acute distress.     Appearance: Normal appearance. He is obese. He is not toxic-appearing.   HENT:      Head: Normocephalic and atraumatic.      Right Ear: Tympanic membrane, ear canal and external ear normal.      Left Ear: Tympanic membrane, ear canal and external ear normal.      Nose: Nose normal. No congestion or rhinorrhea.      Mouth/Throat:      Mouth: Mucous membranes are moist.      Pharynx: No oropharyngeal exudate.   Eyes:      General: No scleral icterus.        Right eye: No discharge.         Left eye: No discharge.      Extraocular Movements: Extraocular movements intact.      Conjunctiva/sclera: Conjunctivae normal.      Pupils: Pupils are equal, round, and reactive to light.   Cardiovascular:      " Rate and Rhythm: Normal rate and regular rhythm.      Pulses: Normal pulses.      Heart sounds: Normal heart sounds. No murmur heard.  Pulmonary:      Effort: Pulmonary effort is normal. No respiratory distress.      Breath sounds: Normal breath sounds. No wheezing.   Abdominal:      General: Abdomen is flat. Bowel sounds are normal. There is no distension.      Palpations: Abdomen is soft.      Tenderness: There is no abdominal tenderness. There is no guarding.   Musculoskeletal:         General: No swelling, tenderness or deformity. Normal range of motion.      Cervical back: Normal range of motion and neck supple. No rigidity or tenderness.   Lymphadenopathy:      Cervical: No cervical adenopathy.   Skin:     General: Skin is warm.      Capillary Refill: Capillary refill takes less than 2 seconds.   Neurological:      General: No focal deficit present.      Mental Status: He is alert and oriented to person, place, and time. Mental status is at baseline.      Cranial Nerves: No cranial nerve deficit.      Gait: Gait normal.   Psychiatric:         Mood and Affect: Mood normal.         Behavior: Behavior normal.         Thought Content: Thought content normal.         Judgment: Judgment normal.        Result Review :  The following data was reviewed by: Stuart Bourne MD on 11/22/2024:  Common labs          11/22/2024    14:10   Common Labs   Glucose 80    BUN 19    Creatinine 1.15    Sodium 143    Potassium 4.3    Chloride 101    Calcium 9.7    Albumin 4.6    Total Bilirubin 1.1    Alkaline Phosphatase 53    AST (SGOT) 27    ALT (SGPT) 25    WBC 7.20    Hemoglobin 14.8    Hematocrit 45.3    Platelets 205    Total Cholesterol 205    Triglycerides 218    HDL Cholesterol 39    LDL Cholesterol  127    Hemoglobin A1C 5.30    PSA 2.070      Data reviewed : Previous notes and cardiology records           Assessment and Plan   Diagnoses and all orders for this visit:    1. Encounter for annual physical exam (Primary)  -      Comprehensive Metabolic Panel  -     Hemoglobin A1c  -     CBC & Differential  -     Lipid Panel    2. Screening PSA (prostate specific antigen)  -     PSA Screen    Other orders  -     amoxicillin-clavulanate (AUGMENTIN) 875-125 MG per tablet; Take 1 tablet by mouth 2 (Two) Times a Day.  Dispense: 14 tablet; Refill: 0    Is here for his annual physical.  Reviewed his past medical, social, surgical, family history as above.  Will get labs as above.  He is due for his prostate screening.  He follows with cardiology for his A-fib.  [id number] is low.  Continue aspirin Cardia and nebivolol per them.  His blood pressure is little bit elevated today.  He states that his blood pressure at home is in the normal range.  For his acute sinusitis we will treat with Augmentin as above.         Follow Up   Return in about 1 year (around 11/22/2025).  Patient was given instructions and counseling regarding his condition or for health maintenance advice. Please see specific information pulled into the AVS if appropriate.

## 2024-11-23 LAB
ALBUMIN SERPL-MCNC: 4.6 G/DL (ref 3.5–5.2)
ALBUMIN/GLOB SERPL: 1.8 G/DL
ALP SERPL-CCNC: 53 U/L (ref 39–117)
ALT SERPL W P-5'-P-CCNC: 25 U/L (ref 1–41)
ANION GAP SERPL CALCULATED.3IONS-SCNC: 13.3 MMOL/L (ref 5–15)
AST SERPL-CCNC: 27 U/L (ref 1–40)
BILIRUB SERPL-MCNC: 1.1 MG/DL (ref 0–1.2)
BUN SERPL-MCNC: 19 MG/DL (ref 8–23)
BUN/CREAT SERPL: 16.5 (ref 7–25)
CALCIUM SPEC-SCNC: 9.7 MG/DL (ref 8.6–10.5)
CHLORIDE SERPL-SCNC: 101 MMOL/L (ref 98–107)
CHOLEST SERPL-MCNC: 205 MG/DL (ref 0–200)
CO2 SERPL-SCNC: 28.7 MMOL/L (ref 22–29)
CREAT SERPL-MCNC: 1.15 MG/DL (ref 0.76–1.27)
EGFRCR SERPLBLD CKD-EPI 2021: 72.9 ML/MIN/1.73
GLOBULIN UR ELPH-MCNC: 2.6 GM/DL
GLUCOSE SERPL-MCNC: 80 MG/DL (ref 65–99)
HDLC SERPL-MCNC: 39 MG/DL (ref 40–60)
LDLC SERPL CALC-MCNC: 127 MG/DL (ref 0–100)
LDLC/HDLC SERPL: 3.14 {RATIO}
POTASSIUM SERPL-SCNC: 4.3 MMOL/L (ref 3.5–5.2)
PROT SERPL-MCNC: 7.2 G/DL (ref 6–8.5)
PSA SERPL-MCNC: 2.07 NG/ML (ref 0–4)
SODIUM SERPL-SCNC: 143 MMOL/L (ref 136–145)
TRIGL SERPL-MCNC: 218 MG/DL (ref 0–150)
VLDLC SERPL-MCNC: 39 MG/DL (ref 5–40)

## 2024-11-25 ENCOUNTER — TELEPHONE (OUTPATIENT)
Dept: FAMILY MEDICINE CLINIC | Facility: CLINIC | Age: 59
End: 2024-11-25
Payer: COMMERCIAL

## 2024-11-25 NOTE — TELEPHONE ENCOUNTER
Tried calling pt with results/orders below and had to LVM to call the office back. 11-25-24 4:52 pm     ----- Message from Stuart Bourne sent at 11/25/2024  3:35 PM EST -----  Overall, labs look stable from last year.  His healthy cholesterol, HDL, remains a little bit low.  Would increase good fats such as fish, avocados, nuts.  His lousy cholesterol, LDL, is a little bit worse than last year at 127 from 125.  Would watch his saturated fat intake and processed food intake.  Based on his previous coronary calcium score does not need any treatment at this time for his cholesterol, but if remains elevated he may need some in the near future.  All other labs look good.

## 2025-02-20 RX ORDER — DILTIAZEM HYDROCHLORIDE 240 MG/1
240 CAPSULE, EXTENDED RELEASE ORAL DAILY
Qty: 90 CAPSULE | Refills: 3 | Status: SHIPPED | OUTPATIENT
Start: 2025-02-20

## 2025-03-06 ENCOUNTER — OFFICE VISIT (OUTPATIENT)
Dept: FAMILY MEDICINE CLINIC | Facility: CLINIC | Age: 60
End: 2025-03-06
Payer: COMMERCIAL

## 2025-03-06 VITALS
SYSTOLIC BLOOD PRESSURE: 128 MMHG | BODY MASS INDEX: 38.36 KG/M2 | DIASTOLIC BLOOD PRESSURE: 80 MMHG | OXYGEN SATURATION: 98 % | WEIGHT: 274 LBS | RESPIRATION RATE: 20 BRPM | HEIGHT: 71 IN | HEART RATE: 79 BPM

## 2025-03-06 DIAGNOSIS — M54.42 CHRONIC LOW BACK PAIN WITH BILATERAL SCIATICA, UNSPECIFIED BACK PAIN LATERALITY: ICD-10-CM

## 2025-03-06 DIAGNOSIS — M54.41 CHRONIC LOW BACK PAIN WITH BILATERAL SCIATICA, UNSPECIFIED BACK PAIN LATERALITY: ICD-10-CM

## 2025-03-06 DIAGNOSIS — G89.29 CHRONIC LOW BACK PAIN WITH BILATERAL SCIATICA, UNSPECIFIED BACK PAIN LATERALITY: ICD-10-CM

## 2025-03-06 DIAGNOSIS — R20.0 BILATERAL LEG NUMBNESS: ICD-10-CM

## 2025-03-06 DIAGNOSIS — Z76.89 ENCOUNTER TO ESTABLISH CARE: Primary | ICD-10-CM

## 2025-03-06 NOTE — PROGRESS NOTES
"Chief Complaint  Chief Complaint   Patient presents with    Establish Care    Pain     Pinched nerve in back       Subjective        Arya Pedraza is a 59 y.o. male who presents to [facility].  History of Present Illness  Presents today to establish care and has concerns for a pinched nerve in his back.    Medical conditions include:  Atrial fibrillation- Aspirin 81 mg QD, nebivolol 10 mg QD, Cartia XT (diltiazem) 240 mg QD, follows with cardiology  HTN- Lisinopril-HCTZ 20-12.5 mg QD   Allergies-Claritin 10 mg PRN  History of colorectal cancer s/p excision, believes due for colonoscopy in 10/2025    Concerns for pinched nerve in his back for a few months.   States this happened before, in that he has had bilateral leg numbness and tingling that resolves with time, especially with walking it off and stretching.  Began having bilateral leg numbness and tingling and low back \"twinge\"/pain that started a few days ago, but this has resolved today.   Bilateral leg numbness and tingling extended to toes.   Thought he was having cramps as he was having some tightness, began increasing water intake, no improvement till today.   Having left sided low back pain.  Stretching his back improved low back pain and leg numbness.   Cannot think of anything that aggravates low back pain and leg numbness other than sitting for too long.  Stretching his back improved low back pain and leg numbness.  Took aleve once, unsure if this helped.   Denies low back injury, leg weakness, saddle anesthesia, or urinary/bowel incontinence.    Objective   /80 (BP Location: Left arm)   Pulse 79   Resp 20   Ht 180.3 cm (70.98\")   Wt 124 kg (274 lb)   SpO2 98%   BMI 38.23 kg/m²     Estimated body mass index is 38.23 kg/m² as calculated from the following:    Height as of this encounter: 180.3 cm (70.98\").    Weight as of this encounter: 124 kg (274 lb).     Physical Exam   GEN: In no acute distress, non " toxic appearing  CV: Regular rate and rhythm, no murmurs, 2+ peripheral pulses, No extremity edema.   RESP: Lungs clear to auscultation anteriorly and posteriorly in all lung fields bilaterally.  MSK: Low back is not TTP, straight leg raise test negative bilaterally  NEURO: AAO to person, place, and time. CN 2-12 intact grossly. Strength 5/5 in BLE. Sensation to light touch intact in BLE.   PSYCH: Affect normal, insight fair         Result Review :              Assessment and Plan     Assessment & Plan  Encounter to establish care         Chronic low back pain with bilateral sciatica, unspecified back pain laterality  As he has been having left-sided and mid low back pain with bilateral leg numbness, lumbar spine x-rays been ordered for further evaluation.  Discussed for him to continue stretching his back as this provides him with relief.  Encouraged him to take Tylenol, apply heat, lidocaine patch, Voltaren gel as needed.  He will be called with results of x-ray.  Orders:    XR Spine Lumbar 2 or 3 View; Future    Bilateral leg numbness         Discussed concerning symptoms that may prompt him to proceed to the ER for urgent evaluation, he understands.  He will keep his next scheduled appointment in November, call with any issues or concerns in the meantime.       Follow Up     Return if symptoms worsen or fail to improve.

## 2025-03-07 DIAGNOSIS — M54.41 CHRONIC LOW BACK PAIN WITH BILATERAL SCIATICA, UNSPECIFIED BACK PAIN LATERALITY: ICD-10-CM

## 2025-03-07 DIAGNOSIS — M54.42 CHRONIC LOW BACK PAIN WITH BILATERAL SCIATICA, UNSPECIFIED BACK PAIN LATERALITY: ICD-10-CM

## 2025-03-07 DIAGNOSIS — G89.29 CHRONIC LOW BACK PAIN WITH BILATERAL SCIATICA, UNSPECIFIED BACK PAIN LATERALITY: ICD-10-CM

## 2025-03-10 RX ORDER — NEBIVOLOL 10 MG/1
10 TABLET ORAL DAILY
Qty: 90 TABLET | Refills: 3 | Status: SHIPPED | OUTPATIENT
Start: 2025-03-10

## 2025-03-10 RX ORDER — LISINOPRIL AND HYDROCHLOROTHIAZIDE 12.5; 2 MG/1; MG/1
1 TABLET ORAL DAILY
Qty: 90 TABLET | Refills: 3 | Status: SHIPPED | OUTPATIENT
Start: 2025-03-10

## 2025-03-11 ENCOUNTER — OFFICE VISIT (OUTPATIENT)
Dept: FAMILY MEDICINE CLINIC | Facility: CLINIC | Age: 60
End: 2025-03-11
Payer: COMMERCIAL

## 2025-03-11 VITALS
HEART RATE: 79 BPM | WEIGHT: 270 LBS | HEIGHT: 71 IN | OXYGEN SATURATION: 96 % | DIASTOLIC BLOOD PRESSURE: 80 MMHG | BODY MASS INDEX: 37.8 KG/M2 | RESPIRATION RATE: 20 BRPM | SYSTOLIC BLOOD PRESSURE: 126 MMHG

## 2025-03-11 DIAGNOSIS — M54.41 CHRONIC LOW BACK PAIN WITH BILATERAL SCIATICA, UNSPECIFIED BACK PAIN LATERALITY: Primary | ICD-10-CM

## 2025-03-11 DIAGNOSIS — G89.29 CHRONIC LOW BACK PAIN WITH BILATERAL SCIATICA, UNSPECIFIED BACK PAIN LATERALITY: Primary | ICD-10-CM

## 2025-03-11 DIAGNOSIS — M54.42 CHRONIC LOW BACK PAIN WITH BILATERAL SCIATICA, UNSPECIFIED BACK PAIN LATERALITY: Primary | ICD-10-CM

## 2025-03-11 RX ORDER — PREDNISONE 10 MG/1
10 TABLET ORAL DAILY
Qty: 7 TABLET | Refills: 0 | Status: SHIPPED | OUTPATIENT
Start: 2025-03-11 | End: 2025-03-18

## 2025-03-11 NOTE — PROGRESS NOTES
"Chief Complaint  Chief Complaint   Patient presents with    Pain     leg       Subjective        Arya Pedraza is a 59 y.o. male who presents to [facility].  History of Present Illness  Presents today for concerns of low back pain and leg pain/numbness that reoccurred over the weekend.   Had bilateral medial thigh numbness and tingling.   Has not worsened since his appointment last week, but just reoccurred this weekend.   Had improvement short term with stretching, applying heat, tylenol, and voltaren gel.   Symptoms have resolved entirely.   Denies leg weakness, urinary/bowel incontinence, or saddle anesthesia.    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     Objective   /80 (BP Location: Left arm)   Pulse 79   Resp 20   Ht 180.3 cm (70.98\")   Wt 122 kg (270 lb)   SpO2 96%   BMI 37.67 kg/m²     Estimated body mass index is 37.67 kg/m² as calculated from the following:    Height as of this encounter: 180.3 cm (70.98\").    Weight as of this encounter: 122 kg (270 lb).     Physical Exam   GEN: In no acute distress, non toxic appearing  MSK: Low back is not TTP.  No joint erythema, deformity, or effusion. Good ROM in upper and lower extremities.  Negative straight leg raise test bilaterally.  NEURO: AAO to person, place, and time. CN 2-12 intact grossly. Strength 5/5 in BLE. Sensation to light touch intact in BLE.   PSYCH: Affect normal, insight fair         Result Review :              Assessment and Plan     Assessment & Plan  Chronic low back pain with bilateral sciatica, unspecified back pain laterality  Low-dose steroid pack has been prescribed for him to take as directed.  Encouraged him to continue adhering to at home management by applying heat, stretching, massaging, applying lidocaine patch, and using Voltaren gel as needed.  Discussed based on " symptoms and x-ray results, he would benefit from physical therapy, he would like a referral but will continue to think on it as he would like to try at home management first.  Discussed we can also perform an MRI of his lumbar spine, although this may not be covered until he tries physical therapy first, which he understands.  Orders:    Ambulatory Referral to Physical Therapy for Evaluation & Treatment    predniSONE (DELTASONE) 10 MG tablet; Take 1 tablet by mouth Daily for 7 days.    He is in agreement with this plan and will call should his symptoms worsen or not improved.       Follow Up     Return if symptoms worsen or fail to improve.

## 2025-03-13 ENCOUNTER — TELEPHONE (OUTPATIENT)
Dept: FAMILY MEDICINE CLINIC | Facility: CLINIC | Age: 60
End: 2025-03-13

## 2025-03-13 DIAGNOSIS — G89.29 CHRONIC LOW BACK PAIN WITH BILATERAL SCIATICA, UNSPECIFIED BACK PAIN LATERALITY: Primary | ICD-10-CM

## 2025-03-13 DIAGNOSIS — M54.42 CHRONIC LOW BACK PAIN WITH BILATERAL SCIATICA, UNSPECIFIED BACK PAIN LATERALITY: Primary | ICD-10-CM

## 2025-03-13 DIAGNOSIS — M54.41 CHRONIC LOW BACK PAIN WITH BILATERAL SCIATICA, UNSPECIFIED BACK PAIN LATERALITY: Primary | ICD-10-CM

## 2025-03-13 NOTE — TELEPHONE ENCOUNTER
"Caller: Arya Pedraza \"Meño\"    Relationship: Self    Best call back number: [phone]     What orders are you requesting (i.e. lab or imaging): LABS TO TEST FOR NEUROPATHY, LACTIC ACID    In what timeframe would the patient need to come in: ASAP    Where will you receive your lab/imaging services: IN OFFICE    Additional notes: PATIENT IS REQUESTING TO HAVE LABS PREFORMED PRIOR TO ANY IMAGING.    PATIENT IS REQUESTING TO HAVE LABS FOR NEUROPATHY AND LACTIC ACID.    PLEASE CALL PATIENT TO ADVISE IF ORDERS ARE ENTERED, AND TO SCHEDULE LABS.    "

## 2025-03-14 ENCOUNTER — TREATMENT (OUTPATIENT)
Dept: PHYSICAL THERAPY | Facility: CLINIC | Age: 60
End: 2025-03-14
Payer: COMMERCIAL

## 2025-03-14 DIAGNOSIS — G89.29 CHRONIC BILATERAL LOW BACK PAIN, UNSPECIFIED WHETHER SCIATICA PRESENT: Primary | ICD-10-CM

## 2025-03-14 DIAGNOSIS — M54.50 CHRONIC BILATERAL LOW BACK PAIN, UNSPECIFIED WHETHER SCIATICA PRESENT: Primary | ICD-10-CM

## 2025-03-14 NOTE — PROGRESS NOTES
Physical Therapy Initial Evaluation and Plan of Care    Patient: Arya Pedraza   : 1965  Diagnosis/ICD-10 Code:  Chronic bilateral low back pain, unspecified whether sciatica present [M54.50, G89.29]  Referring practitioner: Margaux Lopes PA-C  Date of Initial Visit: 3/14/2025  Today's Date: 3/14/2025  Patient seen for 1 sessions           Subjective Questionnaire: Oswestry: 28%      Subjective Evaluation    History of Present Illness  Mechanism of injury: Patient presents to physical therapy with cc pain in BLE that started a few weeks ago.  Patient reports that he has had history of lower back pain, however recently he notes that after he walked on treadmill for about 50 minutes he had tingling and numbness in both legs.  Patient had x-ray on lower back that showed degenerative changes in lumbar spine.  Patient reports that he was placed on steroid pack and is in the middle of that taper at this time.  Patient reports that he has a golf trip coming up in May and wishes to tolerate riding in a car and walking with less BLE pain.     No prior history of surgery on lumbar spine.  Has history of A Fib but not currently on intervention for this.        Patient Occupation: retired Pain  Current pain ratin  At worst pain ratin  Location: BLE  Quality: burning and radiating  Relieving factors: medications  Aggravating factors: ambulation, prolonged positioning, squatting, sleeping and standing  Progression: no change    Diagnostic Tests  X-ray: abnormal    Patient Goals  Patient goals for therapy: decreased pain, increased motion and return to sport/leisure activities           Objective          Palpation   Left   Hypertonic in the lumbar paraspinals.   Tenderness of the lumbar paraspinals.     Right   Hypertonic in the lumbar paraspinals. Tenderness of the lumbar paraspinals.     Tenderness     Additional Tenderness Details  Increase lordosis at L5.  Hypomobility in lumbar spine with PA glide.   No increase in s/s with PA glides    Active Range of Motion     Additional Active Range of Motion Details  Lumbar AROM:      R/L sidebending 25% limited nohemi (no increase s/s)  Extension 40% limited (no increase in s/s)      Passive Range of Motion   Left Hip   Internal rotation (90/90): 0 degrees with pain    Right Hip   Internal rotation (90/90): 5 degrees with pain    Strength/Myotome Testing     Left Hip   Planes of Motion   Flexion: 5  External rotation: 5  Internal rotation: 5    Right Hip   Planes of Motion   Flexion: 5  External rotation: 5  Internal rotation: 5    Left Knee   Flexion: 5  Extension: 5    Right Knee   Flexion: 5  Extension: 5    Left Ankle/Foot   Dorsiflexion: 5    Right Ankle/Foot   Dorsiflexion: 5    Tests     Lumbar   Positive repeated extension.           Assessment & Plan       Assessment  Impairments: abnormal or restricted ROM, activity intolerance, lacks appropriate home exercise program and pain with function   Functional limitations: lifting, walking, pulling, pushing, uncomfortable because of pain and standing   Assessment details: Patient presents to physical therapy with s/s congruent with MD diagnosis of lower back pain.  Patient demonstrates limited AROM in lumbar spine, hypomobility in lumbar spine and bilateral hips, and elevated pain level reported with functional movement patterns.  Patient is appropriate for PT intervention in order to address these deficits so that he may tolerates walking and ADL activities with less pain in lower back/BLE's.  Prognosis: good    Goals  Plan Goals: In two weeks, patient will report at least 25% reduction in pain level.    In two weeks, patient will demonstrate at least 25% improvement in AROM in lumbar spine.     In four weeks, patient will demonstrate proper technique with HEP.  In four weeks, patient will demonstrate lumbar AROM WFL without pain level over 2/10 in order to demonstrate ability to bend down to  object from the floor  without limitation.  In four weeks, patient will report tolerating walking for at least 30 minutes without pain in BLE's in order to demonstrate ability to go to grocery and shop without limitation.  In four weeks, patient will demonstrate decreased perceived disability by decreasing score on Oswestry by at least 12%.        Plan  Therapy options: will be seen for skilled therapy services  Planned modality interventions: cryotherapy, TENS, thermotherapy (hydrocollator packs) and traction  Planned therapy interventions: neuromuscular re-education, manual therapy, soft tissue mobilization, spinal/joint mobilization, strengthening, stretching, therapeutic activities, joint mobilization, home exercise program, functional ROM exercises, flexibility and abdominal trunk stabilization  Frequency: 3x week  Duration in weeks: 6  Treatment plan discussed with: patient        Manual Therapy:    8     mins  60567;  Therapeutic Exercise:    20     mins  06621;     Neuromuscular Anupam:        mins  09206;    Therapeutic Activity:          mins  27587;     Gait Training:           mins  09708;     Ultrasound:          mins  61186;    Electrical Stimulation:         mins  42383 (MC );  Dry Needling          mins self-pay  Traction     15  mins  95835    Timed Treatment:   28   mins   Total Treatment:     68   mins    PT SIGNATURE: Simone Seth PT   DATE TREATMENT INITIATED: 3/14/2025    Initial Certification  Certification Period: 6/12/2025  I certify that the therapy services are furnished while this patient is under my care.  The services outlined above are required by this patient, and will be reviewed every 90 days.     PHYSICIAN: Margaux Lopes PA-C      DATE:     Please sign and return via fax to [phone].. Thank you, [facility].

## 2025-03-18 ENCOUNTER — TREATMENT (OUTPATIENT)
Dept: PHYSICAL THERAPY | Facility: CLINIC | Age: 60
End: 2025-03-18
Payer: COMMERCIAL

## 2025-03-18 DIAGNOSIS — M54.50 CHRONIC BILATERAL LOW BACK PAIN, UNSPECIFIED WHETHER SCIATICA PRESENT: Primary | ICD-10-CM

## 2025-03-18 DIAGNOSIS — G89.29 CHRONIC BILATERAL LOW BACK PAIN, UNSPECIFIED WHETHER SCIATICA PRESENT: Primary | ICD-10-CM

## 2025-03-18 NOTE — PROGRESS NOTES
Physical Therapy Daily Progress Note    Patient: Arya Pedraza   : 1965  Diagnosis/ICD-10 Code:  Chronic bilateral low back pain, unspecified whether sciatica present [M54.50, G89.29]  Referring practitioner: Margaux Lopes PA-C  Date of Initial Visit: Type: THERAPY  Noted: 3/14/2025  Today's Date: 3/18/2025  Patient seen for 2 sessions         Arya Pedraza reports: Has had less pain in BLE's since last week.  Good tolerance with HEP.     Objective   See Exercise, Manual, and Modality Logs for complete treatment.       Assessment/Plan    Feeling well after manual therapy and mechanical traction this visit.  Patient also demonstrates proper technique with exercise progression this visit.  Progressing well.     Progress per Plan of Care           Manual Therapy:    8     mins  50746;  Therapeutic Exercise:         mins  55928;     Neuromuscular Anupam:    15    mins  77357;    Therapeutic Activity:          mins  71444;     Gait Training:           mins  10627;     Ultrasound:          mins  66460;    Electrical Stimulation:         mins  87512 ( );  Dry Needling          mins self-pay  Traction    15  mins  36034    Timed Treatment:   23   mins   Total Treatment:     38   mins    Simone Seth PT  Physical Therapist

## 2025-03-21 ENCOUNTER — TREATMENT (OUTPATIENT)
Dept: PHYSICAL THERAPY | Facility: CLINIC | Age: 60
End: 2025-03-21
Payer: COMMERCIAL

## 2025-03-21 DIAGNOSIS — M54.50 CHRONIC BILATERAL LOW BACK PAIN, UNSPECIFIED WHETHER SCIATICA PRESENT: Primary | ICD-10-CM

## 2025-03-21 DIAGNOSIS — G89.29 CHRONIC BILATERAL LOW BACK PAIN, UNSPECIFIED WHETHER SCIATICA PRESENT: Primary | ICD-10-CM

## 2025-03-21 NOTE — PROGRESS NOTES
Physical Therapy Daily Progress Note    Patient: Arya Pedraza   : 1965  Diagnosis/ICD-10 Code:  Chronic bilateral low back pain, unspecified whether sciatica present [M54.50, G89.29]  Referring practitioner: Margaux Lopes PA-C  Date of Initial Visit: Type: THERAPY  Noted: 3/14/2025  Today's Date: 3/21/2025  Patient seen for 3 sessions         Arya Pedraza reports: Lower back feeling better.  Reports significantly less pain radiating into BLEs the past week.  Good compliance with HEP.    Objective   See Exercise, Manual, and Modality Logs for complete treatment.       Assessment/Plan    Tolerates manual therapy and mechanical traction well this visit.  Progressing well.     Progress per Plan of Care           Manual Therapy:    11     mins  57319;  Therapeutic Exercise:    12     mins  83581;     Neuromuscular Anupam:        mins  79734;    Therapeutic Activity:          mins  53632;     Gait Training:           mins  49937;     Ultrasound:          mins  42926;    Electrical Stimulation:         mins  86218 ( );  Dry Needling          mins self-pay  Traction    15  mins  96530    Timed Treatment:   23   mins   Total Treatment:     38   mins    Simone Seth PT  Physical Therapist

## 2025-03-25 ENCOUNTER — TREATMENT (OUTPATIENT)
Dept: PHYSICAL THERAPY | Facility: CLINIC | Age: 60
End: 2025-03-25
Payer: COMMERCIAL

## 2025-03-25 DIAGNOSIS — G89.29 CHRONIC BILATERAL LOW BACK PAIN, UNSPECIFIED WHETHER SCIATICA PRESENT: Primary | ICD-10-CM

## 2025-03-25 DIAGNOSIS — M54.50 CHRONIC BILATERAL LOW BACK PAIN, UNSPECIFIED WHETHER SCIATICA PRESENT: Primary | ICD-10-CM

## 2025-03-25 PROCEDURE — 97110 THERAPEUTIC EXERCISES: CPT | Performed by: PHYSICAL THERAPIST

## 2025-03-25 PROCEDURE — 97012 MECHANICAL TRACTION THERAPY: CPT | Performed by: PHYSICAL THERAPIST

## 2025-03-25 NOTE — PROGRESS NOTES
Physical Therapy Daily Progress Note    Patient: Arya Pedraza   : 1965  Diagnosis/ICD-10 Code:  Chronic bilateral low back pain, unspecified whether sciatica present [M54.50, G89.29]  Referring practitioner: Margaux Lopes PA-C  Date of Initial Visit: Type: THERAPY  Noted: 3/14/2025  Today's Date: 3/25/2025  Patient seen for 4 sessions         Arya Pedraza reports:  Lower back has been feeling better over the past few weeks.  Compliant with HEP.    Objective   See Exercise, Manual, and Modality Logs for complete treatment.       Assessment/Plan    Good tolerance with mechanical traction this visit.  Feeling well at end of visit.    Progress per Plan of Care           Manual Therapy:         mins  70491;  Therapeutic Exercise:   8    mins  71098;     Neuromuscular Anupam:        mins  91420;    Therapeutic Activity:          mins  79906;     Gait Training:           mins  40437;     Ultrasound:          mins  24719;    Electrical Stimulation:         mins  45855 ( );  Dry Needling          mins self-pay  Traction     15  mins  39154    Timed Treatment:   8   mins   Total Treatment:     23   mins    Simone Seth PT  Physical Therapist

## 2025-03-28 ENCOUNTER — TREATMENT (OUTPATIENT)
Dept: PHYSICAL THERAPY | Facility: CLINIC | Age: 60
End: 2025-03-28
Payer: COMMERCIAL

## 2025-03-28 DIAGNOSIS — M54.50 CHRONIC BILATERAL LOW BACK PAIN, UNSPECIFIED WHETHER SCIATICA PRESENT: Primary | ICD-10-CM

## 2025-03-28 DIAGNOSIS — G89.29 CHRONIC BILATERAL LOW BACK PAIN, UNSPECIFIED WHETHER SCIATICA PRESENT: Primary | ICD-10-CM

## 2025-03-28 NOTE — PROGRESS NOTES
Physical Therapy Daily Progress Note    Patient: Arya Pedraza   : 1965  Diagnosis/ICD-10 Code:  Chronic bilateral low back pain, unspecified whether sciatica present [M54.50, G89.29]  Referring practitioner: Margaux Lopes PA-C  Date of Initial Visit: Type: THERAPY  Noted: 3/14/2025  Today's Date: 3/28/2025  Patient seen for 5 sessions         Arya Pedraza reports:  Was able to walk 18 holes yesterday playing golf, however after the first 9 his lower back started to tighten up.  Mild soreness today.    Objective   See Exercise, Manual, and Modality Logs for complete treatment.       Assessment/Plan    Good tolerance with mechanical traction and manual therapy to bilateral hips this visit.  Progressing well.     Progress per Plan of Care           Manual Therapy:   8      mins  77471;  Therapeutic Exercise:       mins  00044;     Neuromuscular Anupam:        mins  34355;    Therapeutic Activity:          mins  02052;     Gait Training:           mins  32201;     Ultrasound:          mins  13098;    Electrical Stimulation:         mins  42386 ( );  Dry Needling          mins self-pay  Traction     15  mins  92321    Timed Treatment:   8   mins   Total Treatment:     23   mins    Simone Seth PT  Physical Therapist

## 2025-04-01 ENCOUNTER — TREATMENT (OUTPATIENT)
Dept: PHYSICAL THERAPY | Facility: CLINIC | Age: 60
End: 2025-04-01
Payer: COMMERCIAL

## 2025-04-01 DIAGNOSIS — G89.29 CHRONIC BILATERAL LOW BACK PAIN, UNSPECIFIED WHETHER SCIATICA PRESENT: Primary | ICD-10-CM

## 2025-04-01 DIAGNOSIS — M54.50 CHRONIC BILATERAL LOW BACK PAIN, UNSPECIFIED WHETHER SCIATICA PRESENT: Primary | ICD-10-CM

## 2025-04-01 PROCEDURE — 97140 MANUAL THERAPY 1/> REGIONS: CPT | Performed by: PHYSICAL THERAPIST

## 2025-04-01 PROCEDURE — 97012 MECHANICAL TRACTION THERAPY: CPT | Performed by: PHYSICAL THERAPIST

## 2025-04-01 NOTE — PROGRESS NOTES
Physical Therapy Daily Progress Note    Patient: Arya Pedraza   : 1965  Diagnosis/ICD-10 Code:  Chronic bilateral low back pain, unspecified whether sciatica present [M54.50, G89.29]  Referring practitioner: Margaux Lopes PA-C  Date of Initial Visit: Type: THERAPY  Noted: 3/14/2025  Today's Date: 2025  Patient seen for 6 sessions         Arya Pedraza reports:  Lower back/hips are less sore than the end of last week.  Has had fewer episodes of sharp pain in lower back.      Objective   See Exercise, Manual, and Modality Logs for complete treatment.       Assessment/Plan    Feeling well after mechanical traction and manual therapy to bilateral hips this visit.  Progressing well towards goals     Progress per Plan of Care           Manual Therapy:   8      mins  76897;  Therapeutic Exercise:       mins  47873;     Neuromuscular Anupam:        mins  22566;    Therapeutic Activity:          mins  38310;     Gait Training:           mins  21698;     Ultrasound:          mins  04283;    Electrical Stimulation:         mins  12209 ( );  Dry Needling          mins self-pay  Traction     15  mins  63938    Timed Treatment:   8   mins   Total Treatment:     23   mins    Simone Seth PT  Physical Therapist

## 2025-04-04 ENCOUNTER — TREATMENT (OUTPATIENT)
Dept: PHYSICAL THERAPY | Facility: CLINIC | Age: 60
End: 2025-04-04
Payer: COMMERCIAL

## 2025-04-04 DIAGNOSIS — M54.50 CHRONIC BILATERAL LOW BACK PAIN, UNSPECIFIED WHETHER SCIATICA PRESENT: Primary | ICD-10-CM

## 2025-04-04 DIAGNOSIS — G89.29 CHRONIC BILATERAL LOW BACK PAIN, UNSPECIFIED WHETHER SCIATICA PRESENT: Primary | ICD-10-CM

## 2025-04-04 NOTE — PROGRESS NOTES
Physical Therapy Daily Progress Note    Patient: Arya Pedraza   : 1965  Diagnosis/ICD-10 Code:  Chronic bilateral low back pain, unspecified whether sciatica present [M54.50, G89.29]  Referring practitioner: Margaux Lopes PA-C  Date of Initial Visit: Type: THERAPY  Noted: 3/14/2025  Today's Date: 2025  Patient seen for 7 sessions         Arya Pedraza reports:  Had some tingling in his feet after walking on treadmill at incline of 4 yesterday.  However, reports that symptoms have decreased today.  Feeling better since starting PT intervention.    Objective   See Exercise, Manual, and Modality Logs for complete treatment.       Assessment/Plan    Good tolerance with mechanical traction and manual therapy today.  Progressing well.      Progress per Plan of Care           Manual Therapy:   8      mins  30665;  Therapeutic Exercise:       mins  72591;     Neuromuscular Anupam:        mins  52378;    Therapeutic Activity:          mins  31564;     Gait Training:           mins  20395;     Ultrasound:          mins  08914;    Electrical Stimulation:         mins  38924 ( );  Dry Needling          mins self-pay  Traction     15  mins  36824    Timed Treatment:   8   mins   Total Treatment:     23   mins    Simone Seth PT  Physical Therapist

## 2025-04-08 ENCOUNTER — TREATMENT (OUTPATIENT)
Dept: PHYSICAL THERAPY | Facility: CLINIC | Age: 60
End: 2025-04-08
Payer: COMMERCIAL

## 2025-04-08 DIAGNOSIS — G89.29 CHRONIC BILATERAL LOW BACK PAIN, UNSPECIFIED WHETHER SCIATICA PRESENT: Primary | ICD-10-CM

## 2025-04-08 DIAGNOSIS — M54.50 CHRONIC BILATERAL LOW BACK PAIN, UNSPECIFIED WHETHER SCIATICA PRESENT: Primary | ICD-10-CM

## 2025-04-08 NOTE — PROGRESS NOTES
Physical Therapy Daily Progress Note    Patient: Arya Pedraza   : 1965  Diagnosis/ICD-10 Code:  Chronic bilateral low back pain, unspecified whether sciatica present [M54.50, G89.29]  Referring practitioner: Margaux Lopes PA-C  Date of Initial Visit: Type: THERAPY  Noted: 3/14/2025  Today's Date: 2025  Patient seen for 8 sessions         Arya Pedraza reports:  Lower back continues to feel better.  Reports that he was able to use push mower without significant increase in pain level.    Objective   See Exercise, Manual, and Modality Logs for complete treatment.       Assessment/Plan    Feeling well after mechanical traction and manual therapy today.      Progress per Plan of Care           Manual Therapy:   8      mins  18136;  Therapeutic Exercise:       mins  05559;     Neuromuscular Anupam:        mins  79785;    Therapeutic Activity:          mins  87035;     Gait Training:           mins  52290;     Ultrasound:          mins  70859;    Electrical Stimulation:         mins  08569 ( );  Dry Needling          mins self-pay  Traction     15  mins  27390    Timed Treatment:   8   mins   Total Treatment:     23   mins    Simone Seth PT  Physical Therapist

## 2025-04-08 NOTE — PROGRESS NOTES
EMG and Nerve Conduction Studies    Patient Name: Arya Pedraza  Date of birth 1965  Date of Study:4/16/25    Referring Provider:DARLEEN MINOR PA-C    History:    This patient is a 59-year-old male who has chronic low back pain with associated intermittent paresthesias mostly in the thigh bilaterally.    Results:    The complete report includes the data sheets.     Prior to starting the procedure, the patient's identity was verified, pertinent available records were reviewed, the nature of the procedure was explained, the appropriate site of the exam were confirmed directly with the patient, and a pre-procedure pause was performed for final verification of all the above.    1.  The right sural sensory nerve conduction study was normal.  The right medial plantar was mildly abnormal but this is of questionable clinical significance at the patient's age.  The right peroneal and left tibial motor nerve conduction studies were normal.    2.  Electromyography of the vastus lateralis, vastus medialis, tibialis anterior, gastrocnemius, and extensor digitorum brevis muscles were normal bilaterally.        Impression:    This is a normal study of the lower extremities.  There is no evidence of significant motor or sensory neuropathy, and electromyography of the muscles examined in the bilateral L3-S1 myotomes were normal.      Electronically signed by :    Joseph Seipel, M.D.  April 16, 2025

## 2025-04-11 ENCOUNTER — TREATMENT (OUTPATIENT)
Dept: PHYSICAL THERAPY | Facility: CLINIC | Age: 60
End: 2025-04-11
Payer: COMMERCIAL

## 2025-04-11 DIAGNOSIS — G89.29 CHRONIC BILATERAL LOW BACK PAIN, UNSPECIFIED WHETHER SCIATICA PRESENT: Primary | ICD-10-CM

## 2025-04-11 DIAGNOSIS — M54.50 CHRONIC BILATERAL LOW BACK PAIN, UNSPECIFIED WHETHER SCIATICA PRESENT: Primary | ICD-10-CM

## 2025-04-11 NOTE — PROGRESS NOTES
Physical Therapy Daily Progress Note    Patient: Aray Pedraza   : 1965  Diagnosis/ICD-10 Code:  Chronic bilateral low back pain, unspecified whether sciatica present [M54.50, G89.29]  Referring practitioner: Margaux Lopes PA-C  Date of Initial Visit: Type: THERAPY  Noted: 3/14/2025  Today's Date: 2025  Patient seen for 9 sessions         Arya Pedraza reports:  Lower back feeling better.  Had some tightness in hips after walking the golf course yesterday.      Objective   See Exercise, Manual, and Modality Logs for complete treatment.       Assessment/Plan    Good tolerance with mechanical traction and manual therapy today.  No new complaints at end of visit.    Progress per Plan of Care           Manual Therapy:   8      mins  90028;  Therapeutic Exercise:       mins  87733;     Neuromuscular Anupam:        mins  68115;    Therapeutic Activity:          mins  19806;     Gait Training:           mins  47448;     Ultrasound:          mins  12950;    Electrical Stimulation:         mins  65289 ( );  Dry Needling          mins self-pay  Traction     15  mins  27636    Timed Treatment:   8   mins   Total Treatment:     23   mins    Simone Seth PT  Physical Therapist

## 2025-04-15 ENCOUNTER — TREATMENT (OUTPATIENT)
Dept: PHYSICAL THERAPY | Facility: CLINIC | Age: 60
End: 2025-04-15
Payer: COMMERCIAL

## 2025-04-15 DIAGNOSIS — M54.50 CHRONIC BILATERAL LOW BACK PAIN, UNSPECIFIED WHETHER SCIATICA PRESENT: Primary | ICD-10-CM

## 2025-04-15 DIAGNOSIS — G89.29 CHRONIC BILATERAL LOW BACK PAIN, UNSPECIFIED WHETHER SCIATICA PRESENT: Primary | ICD-10-CM

## 2025-04-15 PROCEDURE — 97012 MECHANICAL TRACTION THERAPY: CPT | Performed by: PHYSICAL THERAPIST

## 2025-04-15 PROCEDURE — 97140 MANUAL THERAPY 1/> REGIONS: CPT | Performed by: PHYSICAL THERAPIST

## 2025-04-15 NOTE — PROGRESS NOTES
Physical Therapy Daily Progress Note      Patient: Arya Pedraza   : 1965  Diagnosis/ICD-10 Code:  Chronic bilateral low back pain, unspecified whether sciatica present [M54.50, G89.29]  Referring practitioner: Margaux Lopes PA-C  Date of Initial Visit: Type: THERAPY  Noted: 3/14/2025  Today's Date: 4/15/2025  Patient seen for 10 sessions             Subjective Back still bothers him, but definitely feeling better since he stopped walking with an incline on the treadmill.     Objective   See Exercise, Manual, and Modality Logs for complete treatment.       Assessment/Plan  Responded well to manual techniques and lumbar mechanical traction.    Progress per Plan of Care           Timed:         Manual Therapy:    12     mins  45781;         Un-Timed:  Traction     15     mins 78810      Timed Treatment:   12   mins   Total Treatment:     27   mins        Александр Chu PTA  Physical Therapist Assistant

## 2025-04-16 ENCOUNTER — PROCEDURE VISIT (OUTPATIENT)
Dept: NEUROLOGY | Facility: CLINIC | Age: 60
End: 2025-04-16
Payer: COMMERCIAL

## 2025-04-16 VITALS — SYSTOLIC BLOOD PRESSURE: 117 MMHG | HEART RATE: 80 BPM | DIASTOLIC BLOOD PRESSURE: 85 MMHG

## 2025-04-16 DIAGNOSIS — G89.29 CHRONIC LOW BACK PAIN WITH BILATERAL SCIATICA, UNSPECIFIED BACK PAIN LATERALITY: ICD-10-CM

## 2025-04-16 DIAGNOSIS — M54.42 CHRONIC LOW BACK PAIN WITH BILATERAL SCIATICA, UNSPECIFIED BACK PAIN LATERALITY: ICD-10-CM

## 2025-04-16 DIAGNOSIS — M54.41 CHRONIC LOW BACK PAIN WITH BILATERAL SCIATICA, UNSPECIFIED BACK PAIN LATERALITY: ICD-10-CM

## 2025-04-22 ENCOUNTER — TREATMENT (OUTPATIENT)
Dept: PHYSICAL THERAPY | Facility: CLINIC | Age: 60
End: 2025-04-22
Payer: COMMERCIAL

## 2025-04-22 DIAGNOSIS — G89.29 CHRONIC BILATERAL LOW BACK PAIN, UNSPECIFIED WHETHER SCIATICA PRESENT: Primary | ICD-10-CM

## 2025-04-22 DIAGNOSIS — M54.50 CHRONIC BILATERAL LOW BACK PAIN, UNSPECIFIED WHETHER SCIATICA PRESENT: Primary | ICD-10-CM

## 2025-04-22 PROCEDURE — 97140 MANUAL THERAPY 1/> REGIONS: CPT | Performed by: PHYSICAL THERAPIST

## 2025-04-22 PROCEDURE — 97012 MECHANICAL TRACTION THERAPY: CPT | Performed by: PHYSICAL THERAPIST

## 2025-04-22 NOTE — PROGRESS NOTES
Physical Therapy Daily Progress Note      Patient: Arya Pedraza   : 1965  Diagnosis/ICD-10 Code:  Chronic bilateral low back pain, unspecified whether sciatica present [M54.50, G89.29]  Referring practitioner: Margaux Lopes PA-C  Date of Initial Visit: Type: THERAPY  Noted: 3/14/2025  Today's Date: 2025  Patient seen for 11 sessions             Subjective Pt drove to [place], [place] for a few days, which is about 7-8 hours each way.  He says that he was and still is stiff in his back from the long drive.    Objective   See Exercise, Manual, and Modality Logs for complete treatment.       Assessment/Plan  Responded well to manual techniques and mechanical traction today.    Progress per Plan of Care           Timed:         Manual Therapy:    12     mins  21438;         Un-Timed:  Traction     15     mins 50350      Timed Treatment:   12   mins   Total Treatment:     27   mins        Александр Chu PTA  Physical Therapist Assistant

## 2025-04-25 ENCOUNTER — TREATMENT (OUTPATIENT)
Dept: PHYSICAL THERAPY | Facility: CLINIC | Age: 60
End: 2025-04-25
Payer: COMMERCIAL

## 2025-04-25 DIAGNOSIS — G89.29 CHRONIC BILATERAL LOW BACK PAIN, UNSPECIFIED WHETHER SCIATICA PRESENT: Primary | ICD-10-CM

## 2025-04-25 DIAGNOSIS — M54.50 CHRONIC BILATERAL LOW BACK PAIN, UNSPECIFIED WHETHER SCIATICA PRESENT: Primary | ICD-10-CM

## 2025-04-25 NOTE — PROGRESS NOTES
Re-Assessment / Re-Certification      Patient: Arya Pedrzaa   : 1965  Diagnosis/ICD-10 Code:  Chronic bilateral low back pain, unspecified whether sciatica present [M54.50, G89.29]  Referring practitioner: Margaux Lopes PA-C  Date of Initial Visit: Type: THERAPY  Noted: 3/14/2025  Today's Date: 2025  Patient seen for 12 sessions      Subjective:   Arya Pedraza reports: Lower back has been feeling much better.  Has tolerated playing golf multiple times per week without back pain.  Still having tingling and numbness in both feet at times, specifically in the mornings.   Subjective Questionnaire: Oswestry: 18%  Clinical Progress: improved  Home Program Compliance: Yes  Treatment has included: therapeutic exercise, neuromuscular re-education, manual therapy, and traction    Subjective Evaluation    Pain  Current pain ratin  At worst pain rating: 3  Location: bilateral plantar surface of feet  Quality: needle-like         Objective     Active Range of Motion      Additional Active Range of Motion Details  Lumbar AROM:       R sidebending wnl  L sidebending 25% limited  Extension 25% limited        Passive Range of Motion   Left Hip   Internal rotation (90/90): 12 degrees      Right Hip   Internal rotation (90/90): 18 degrees     Assessment/Plan  Progress toward previous goals: Partially Met    Goals:   In two weeks, patient will report at least 25% reduction in pain level.  met  In two weeks, patient will demonstrate at least 25% improvement in AROM in lumbar spine. met     In four weeks, patient will demonstrate proper technique with HEP. met  In four weeks, patient will demonstrate lumbar AROM WFL without pain level over 2/10 in order to demonstrate ability to bend down to  object from the floor without limitation. Progressing  In four weeks, patient will report tolerating walking for at least 30 minutes without pain in BLE's in order to demonstrate ability to go to grocery and shop  without limitation. met  In four weeks, patient will demonstrate decreased perceived disability by decreasing score on Oswestry by at least 12%. Progressing    New goals (4/25/25):    In four weeks, patient will demonstrate lumbar AROM WNL without pain level over 2/10 in order to demonstrate ability to bend down and  kp from ground while golfing without pain.  In four weeks, patient will tolerate walking for at least 45 minutes without pain over 2/10 for at least 3 consecutive days.    Patient demonstrates improved AROM in lumbar spine, improved PROM in bilateral hips, and reports decreased pain level in lower back and BLE's.  Patient does demonstrate remaining limitation in AROM in lumbar spine and elevated pain level.  Patient is appropriate to continue PT intervention in order to continue progressing towards goals.       Recommendations: Continue as planned  Timeframe: 1 month  Prognosis to achieve goals: good    PT Signature: Simone Seth, PT      Based upon review of the patient's progress and continued therapy plan, it is my medical opinion that Arya Pedraza should continue physical therapy treatment at [facility] PH[facility]  [address] [place] [address] [address] [place] [address] [phone].    Signature: __________________________________  Margaux Lopes PA-C    Manual Therapy:    15     mins  30338;  Therapeutic Exercise:         mins  88164;     Neuromuscular Anupam:        mins  08648;    Therapeutic Activity:          mins  50415;     Gait Training:           mins  70458;     Ultrasound:          mins  11313;    Electrical Stimulation:         mins  16892 ( );  Dry Needling          mins self-pay  Traction    15  mins  47145  PT RE-EVAL      15  mins  31405    Timed Treatment:   15   mins   Total Treatment:     45   mins

## 2025-05-01 ENCOUNTER — TREATMENT (OUTPATIENT)
Dept: PHYSICAL THERAPY | Facility: CLINIC | Age: 60
End: 2025-05-01
Payer: COMMERCIAL

## 2025-05-01 DIAGNOSIS — M54.50 CHRONIC BILATERAL LOW BACK PAIN, UNSPECIFIED WHETHER SCIATICA PRESENT: Primary | ICD-10-CM

## 2025-05-01 DIAGNOSIS — G89.29 CHRONIC BILATERAL LOW BACK PAIN, UNSPECIFIED WHETHER SCIATICA PRESENT: Primary | ICD-10-CM

## 2025-05-01 NOTE — PROGRESS NOTES
Physical Therapy Daily Progress Note    Patient: Arya Pedraza   : 1965  Diagnosis/ICD-10 Code:  Chronic bilateral low back pain, unspecified whether sciatica present [M54.50, G89.29]  Referring practitioner: Margaux Lopes PA-C  Date of Initial Visit: Type: THERAPY  Noted: 3/14/2025  Today's Date: 2025  Patient seen for 13 sessions         Arya Pedraza reports:  Had some tightness in left hip this morning.  Often has been feeling tightness in the mornings, then as the day progresses this pain decreases.  Overall feeling better.      Objective   See Exercise, Manual, and Modality Logs for complete treatment.       Assessment/Plan    Feeling well after joint mobilization and mechanical traction this visit.  Progressing well towards goals.    Progress per Plan of Care           Manual Therapy:   8      mins  04922;  Therapeutic Exercise:       mins  74139;     Neuromuscular Anupam:        mins  70331;    Therapeutic Activity:          mins  58400;     Gait Training:           mins  48481;     Ultrasound:          mins  33671;    Electrical Stimulation:         mins  82381 ( );  Dry Needling          mins self-pay  Traction     15  mins  17887    Timed Treatment:   8   mins   Total Treatment:     23   mins    Simone Seth PT  Physical Therapist

## 2025-05-09 ENCOUNTER — TREATMENT (OUTPATIENT)
Dept: PHYSICAL THERAPY | Facility: CLINIC | Age: 60
End: 2025-05-09
Payer: COMMERCIAL

## 2025-05-09 DIAGNOSIS — M54.50 CHRONIC BILATERAL LOW BACK PAIN, UNSPECIFIED WHETHER SCIATICA PRESENT: Primary | ICD-10-CM

## 2025-05-09 DIAGNOSIS — G89.29 CHRONIC BILATERAL LOW BACK PAIN, UNSPECIFIED WHETHER SCIATICA PRESENT: Primary | ICD-10-CM

## 2025-05-09 NOTE — PROGRESS NOTES
Physical Therapy Daily Progress Note    Patient: Arya Pedraza   : 1965  Diagnosis/ICD-10 Code:  Chronic bilateral low back pain, unspecified whether sciatica present [M54.50, G89.29]  Referring practitioner: Margaux Lopes PA-C  Date of Initial Visit: Type: THERAPY  Noted: 3/14/2025  Today's Date: 2025  Patient seen for 14 sessions         Arya Pedraza reports:  Lower back feeling better.  Has not had the chance to play golf this week due to the weather.  Mild pain in lower back reported, but episodes occurring less frequently.     Objective   See Exercise, Manual, and Modality Logs for complete treatment.       Assessment/Plan    Tolerates manual therapy and mechanical traction well this visit.  Compliant with HEP.  Progressing well.     Progress per Plan of Care           Manual Therapy:    8     mins  80826;  Therapeutic Exercise:     3    mins  75532;     Neuromuscular Anupam:        mins  45821;    Therapeutic Activity:          mins  84365;     Gait Training:           mins  28137;     Ultrasound:          mins  50146;    Electrical Stimulation:         mins  05626 ( );  Dry Needling          mins self-pay  Traction    15  mins  63667    Timed Treatment:   11   mins   Total Treatment:     26   mins    Simone Seth PT  Physical Therapist

## 2025-05-12 ENCOUNTER — TREATMENT (OUTPATIENT)
Dept: PHYSICAL THERAPY | Facility: CLINIC | Age: 60
End: 2025-05-12
Payer: COMMERCIAL

## 2025-05-12 DIAGNOSIS — M54.50 CHRONIC BILATERAL LOW BACK PAIN, UNSPECIFIED WHETHER SCIATICA PRESENT: Primary | ICD-10-CM

## 2025-05-12 DIAGNOSIS — G89.29 CHRONIC BILATERAL LOW BACK PAIN, UNSPECIFIED WHETHER SCIATICA PRESENT: Primary | ICD-10-CM

## 2025-05-12 PROCEDURE — 97140 MANUAL THERAPY 1/> REGIONS: CPT | Performed by: PHYSICAL THERAPIST

## 2025-05-12 PROCEDURE — 97012 MECHANICAL TRACTION THERAPY: CPT | Performed by: PHYSICAL THERAPIST

## 2025-05-12 NOTE — PROGRESS NOTES
Physical Therapy Daily Progress Note    Patient: Arya Pedraza   : 1965  Diagnosis/ICD-10 Code:  Chronic bilateral low back pain, unspecified whether sciatica present [M54.50, G89.29]  Referring practitioner: Margaux Lopes PA-C  Date of Initial Visit: Type: THERAPY  Noted: 3/14/2025  Today's Date: 2025  Patient seen for 15 sessions         Arya Pedraza reports:   Lower back feeling better.  Reports that he played golf yesterday with only mild lower back pain.  Compliant with HEP.    Objective   See Exercise, Manual, and Modality Logs for complete treatment.       Assessment/Plan    Good tolerance with mechanical traction and manual therapy.  Feeling well at end of visit.    Progress per Plan of Care           Manual Therapy:    8     mins  03095;  Therapeutic Exercise:     3    mins  21396;     Neuromuscular Anupam:        mins  91454;    Therapeutic Activity:          mins  11519;     Gait Training:           mins  19698;     Ultrasound:          mins  43946;    Electrical Stimulation:         mins  92542 ( );  Dry Needling          mins self-pay  Traction    15  mins  66796    Timed Treatment:   11   mins   Total Treatment:     26   mins    Simone Seth PT  Physical Therapist

## 2025-05-23 ENCOUNTER — TREATMENT (OUTPATIENT)
Dept: PHYSICAL THERAPY | Facility: CLINIC | Age: 60
End: 2025-05-23
Payer: COMMERCIAL

## 2025-05-23 DIAGNOSIS — G89.29 CHRONIC BILATERAL LOW BACK PAIN, UNSPECIFIED WHETHER SCIATICA PRESENT: Primary | ICD-10-CM

## 2025-05-23 DIAGNOSIS — M54.50 CHRONIC BILATERAL LOW BACK PAIN, UNSPECIFIED WHETHER SCIATICA PRESENT: Primary | ICD-10-CM

## 2025-05-23 NOTE — PROGRESS NOTES
Re-Assessment / Re-Certification      Patient: Arya Pedraza   : 1965  Diagnosis/ICD-10 Code:  Chronic bilateral low back pain, unspecified whether sciatica present [M54.50, G89.29]  Referring practitioner: Margaux Lopes PA-C  Date of Initial Visit: Type: THERAPY  Noted: 3/14/2025  Today's Date: 2025  Patient seen for 16 sessions      Subjective:   Arya Pedraza reports:  Lower back feeling better.  Still getting intermittent pain in feet with prolonged standing and laying supine.  Patient currently only walking on flat surfaces, due to walking on incline increasing symptoms in BLE's.   Subjective Questionnaire: Oswestry: 16%  Clinical Progress: improved  Home Program Compliance: Yes  Treatment has included: therapeutic exercise, neuromuscular re-education, manual therapy, and traction    Subjective Evaluation    Pain  Current pain ratin  At worst pain rating: 3  Location: bilateral plantar surface of feet  Quality: needle-like         Objective          Palpation   Left   Hypertonic in the lumbar paraspinals.   Tenderness of the lumbar paraspinals.     Right   Hypertonic in the lumbar paraspinals. Tenderness of the lumbar paraspinals.     Ambulation     Observational Gait   Decreased stride length.     Quality of Movement During Gait     Knee    Knee (Left): Positive increased ER tibial torsion.   Knee (Right): Positive increased ER tibial torsion.         Active Range of Motion      Additional Active Range of Motion Details  Lumbar AROM:       R sidebending wnl  L sidebending 25% limited  Extension 25% limited        Passive Range of Motion   Left Hip   Internal rotation (90/90): 16 degrees      Right Hip   Internal rotation (90/90): 15 degrees     Assessment/Plan  Progress toward previous goals: Partially Met    Goals:   In two weeks, patient will report at least 25% reduction in pain level.  met  In two weeks, patient will demonstrate at least 25% improvement in AROM in lumbar spine.  met     In four weeks, patient will demonstrate proper technique with HEP. met  In four weeks, patient will demonstrate lumbar AROM WFL without pain level over 2/10 in order to demonstrate ability to bend down to  object from the floor without limitation. Progressing  In four weeks, patient will report tolerating walking for at least 30 minutes without pain in BLE's in order to demonstrate ability to go to grocery and shop without limitation. met  In four weeks, patient will demonstrate decreased perceived disability by decreasing score on Oswestry by at least 12%. Progressing    New goals (4/25/25):    In four weeks, patient will demonstrate lumbar AROM WNL without pain level over 2/10 in order to demonstrate ability to bend down and  kp from ground while golfing without pain. Progressing  In four weeks, patient will tolerate walking for at least 45 minutes without pain over 2/10 for at least 3 consecutive days. Progressing    New goals (5/23/25):    In three weeks, patient will demonstrate lumbar AROM wnl without pain level over 2/10 in order to demonstrate ability to tolerate bending down to  kp from ground without pain/limitation.  In three weeks, patient will demonstrate bilateral hip AROM wnl in order to demonstrate no limitation with getting in/out of car.     Patient reports less frequent pain in lower back and BLE's, however still getting some with walking and laying down.  Patient is compliant with HEP and has made good progress with PT intervention and is returning to ADL's and recreational activities.  Noted continued lumbar/bilateral hip AROM limitations.  Patient is appropriate to continue PT intervention in order to continue progressing towards goals.       Recommendations: Continue as planned  Timeframe: 1 month  Prognosis to achieve goals: good    PT Signature: Simone Seth PT      Based upon review of the patient's progress and continued therapy plan, it is my medical opinion  that Arya Milo should continue physical therapy treatment at [facility] PH[facility] THERAP[facility]  [address] [place] [address] [phone].    Signature: __________________________________  Margaux Lopes PA-C    Manual Therapy:    15     mins  68754;  Therapeutic Exercise:         mins  11302;     Neuromuscular Anupam:        mins  04493;    Therapeutic Activity:          mins  77893;     Gait Training:           mins  36832;     Ultrasound:          mins  12910;    Electrical Stimulation:         mins  09890 ( );  Dry Needling          mins self-pay  Traction    15  mins  37019  PT RE-EVAL      15  mins  90516    Timed Treatment:   15   mins   Total Treatment:     45   mins